# Patient Record
Sex: MALE | Race: WHITE | Employment: FULL TIME
[De-identification: names, ages, dates, MRNs, and addresses within clinical notes are randomized per-mention and may not be internally consistent; named-entity substitution may affect disease eponyms.]

---
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
## 2022-05-17 ENCOUNTER — OFFICE VISIT (OUTPATIENT)
Dept: FAMILY MEDICINE CLINIC | Age: 22
End: 2022-05-17
Payer: COMMERCIAL

## 2022-05-17 VITALS
SYSTOLIC BLOOD PRESSURE: 170 MMHG | WEIGHT: 315 LBS | HEART RATE: 88 BPM | BODY MASS INDEX: 41.75 KG/M2 | DIASTOLIC BLOOD PRESSURE: 110 MMHG | HEIGHT: 73 IN | OXYGEN SATURATION: 98 %

## 2022-05-17 DIAGNOSIS — F90.2 ATTENTION DEFICIT HYPERACTIVITY DISORDER (ADHD), COMBINED TYPE: ICD-10-CM

## 2022-05-17 DIAGNOSIS — E66.01 SEVERE OBESITY (BMI >= 40) (HCC): ICD-10-CM

## 2022-05-17 DIAGNOSIS — F41.9 ANXIETY: ICD-10-CM

## 2022-05-17 DIAGNOSIS — I10 HYPERTENSION, UNSPECIFIED TYPE: ICD-10-CM

## 2022-05-17 DIAGNOSIS — Z82.49 FAMILY HISTORY OF MI (MYOCARDIAL INFARCTION): ICD-10-CM

## 2022-05-17 DIAGNOSIS — Z76.89 ENCOUNTER TO ESTABLISH CARE: Primary | ICD-10-CM

## 2022-05-17 DIAGNOSIS — F32.A DEPRESSION, UNSPECIFIED DEPRESSION TYPE: ICD-10-CM

## 2022-05-17 DIAGNOSIS — R29.818 SUSPECTED SLEEP APNEA: ICD-10-CM

## 2022-05-17 PROCEDURE — 99204 OFFICE O/P NEW MOD 45 MIN: CPT

## 2022-05-17 RX ORDER — BUPROPION HYDROCHLORIDE 150 MG/1
150 TABLET ORAL EVERY MORNING
Qty: 90 TABLET | Refills: 0 | Status: SHIPPED
Start: 2022-05-17 | End: 2022-06-24 | Stop reason: ALTCHOICE

## 2022-05-17 RX ORDER — LISINOPRIL AND HYDROCHLOROTHIAZIDE 12.5; 1 MG/1; MG/1
1 TABLET ORAL DAILY
Qty: 90 TABLET | Refills: 1 | Status: SHIPPED | OUTPATIENT
Start: 2022-05-17 | End: 2022-06-24

## 2022-05-17 SDOH — ECONOMIC STABILITY: FOOD INSECURITY: WITHIN THE PAST 12 MONTHS, YOU WORRIED THAT YOUR FOOD WOULD RUN OUT BEFORE YOU GOT MONEY TO BUY MORE.: NEVER TRUE

## 2022-05-17 SDOH — ECONOMIC STABILITY: FOOD INSECURITY: WITHIN THE PAST 12 MONTHS, THE FOOD YOU BOUGHT JUST DIDN'T LAST AND YOU DIDN'T HAVE MONEY TO GET MORE.: NEVER TRUE

## 2022-05-17 ASSESSMENT — ANXIETY QUESTIONNAIRES
5. BEING SO RESTLESS THAT IT IS HARD TO SIT STILL: 2
IF YOU CHECKED OFF ANY PROBLEMS ON THIS QUESTIONNAIRE, HOW DIFFICULT HAVE THESE PROBLEMS MADE IT FOR YOU TO DO YOUR WORK, TAKE CARE OF THINGS AT HOME, OR GET ALONG WITH OTHER PEOPLE: VERY DIFFICULT
4. TROUBLE RELAXING: 2
6. BECOMING EASILY ANNOYED OR IRRITABLE: 3
1. FEELING NERVOUS, ANXIOUS, OR ON EDGE: 2
3. WORRYING TOO MUCH ABOUT DIFFERENT THINGS: 2
GAD7 TOTAL SCORE: 16
2. NOT BEING ABLE TO STOP OR CONTROL WORRYING: 2
7. FEELING AFRAID AS IF SOMETHING AWFUL MIGHT HAPPEN: 3

## 2022-05-17 ASSESSMENT — PATIENT HEALTH QUESTIONNAIRE - PHQ9
4. FEELING TIRED OR HAVING LITTLE ENERGY: 2
SUM OF ALL RESPONSES TO PHQ QUESTIONS 1-9: 19
1. LITTLE INTEREST OR PLEASURE IN DOING THINGS: 3
SUM OF ALL RESPONSES TO PHQ QUESTIONS 1-9: 18
3. TROUBLE FALLING OR STAYING ASLEEP: 3
8. MOVING OR SPEAKING SO SLOWLY THAT OTHER PEOPLE COULD HAVE NOTICED. OR THE OPPOSITE, BEING SO FIGETY OR RESTLESS THAT YOU HAVE BEEN MOVING AROUND A LOT MORE THAN USUAL: 2
10. IF YOU CHECKED OFF ANY PROBLEMS, HOW DIFFICULT HAVE THESE PROBLEMS MADE IT FOR YOU TO DO YOUR WORK, TAKE CARE OF THINGS AT HOME, OR GET ALONG WITH OTHER PEOPLE: 2
7. TROUBLE CONCENTRATING ON THINGS, SUCH AS READING THE NEWSPAPER OR WATCHING TELEVISION: 3
SUM OF ALL RESPONSES TO PHQ QUESTIONS 1-9: 19
5. POOR APPETITE OR OVEREATING: 1
2. FEELING DOWN, DEPRESSED OR HOPELESS: 1
9. THOUGHTS THAT YOU WOULD BE BETTER OFF DEAD, OR OF HURTING YOURSELF: 1
SUM OF ALL RESPONSES TO PHQ9 QUESTIONS 1 & 2: 4
SUM OF ALL RESPONSES TO PHQ QUESTIONS 1-9: 19
6. FEELING BAD ABOUT YOURSELF - OR THAT YOU ARE A FAILURE OR HAVE LET YOURSELF OR YOUR FAMILY DOWN: 3

## 2022-05-17 ASSESSMENT — SOCIAL DETERMINANTS OF HEALTH (SDOH): HOW HARD IS IT FOR YOU TO PAY FOR THE VERY BASICS LIKE FOOD, HOUSING, MEDICAL CARE, AND HEATING?: NOT HARD AT ALL

## 2022-05-17 NOTE — PROGRESS NOTES
Yolanda Reese (:  2000) is a 19 y.o. male,New patient, here for evaluation of the following chief complaint(s):  ADHD, Hypertension, and New Patient         ASSESSMENT/PLAN:  1. Encounter to establish care  2. Suspected sleep apnea  -     Home Sleep Study; Future  3. Hypertension, unspecified type  -     Hemoglobin A1C; Future  -     Home Sleep Study; Future  -     CBC; Future  -     Comprehensive Metabolic Panel, Fasting; Future  -     TSH with Reflex; Future  -     Lipid Panel; Future  -     ECHO Complete 2D W Doppler W Color; Future  -     EKG 12 Lead; Future  -     lisinopril-hydroCHLOROthiazide (PRINZIDE;ZESTORETIC) 10-12.5 MG per tablet; Take 1 tablet by mouth daily, Disp-90 tablet, R-1Normal  4. Anxiety  -     Vitamin D 25 Hydroxy; Future  -     buPROPion (WELLBUTRIN XL) 150 MG extended release tablet; Take 1 tablet by mouth every morning, Disp-90 tablet, R-0Normal  5. Depression, unspecified depression type  -     Vitamin D 25 Hydroxy; Future  -     buPROPion (WELLBUTRIN XL) 150 MG extended release tablet; Take 1 tablet by mouth every morning, Disp-90 tablet, R-0Normal  6. Family history of MI (myocardial infarction)  -     ECHO Complete 2D W Doppler W Color; Future  7. Attention deficit hyperactivity disorder (ADHD), combined type  -     buPROPion (WELLBUTRIN XL) 150 MG extended release tablet; Take 1 tablet by mouth every morning, Disp-90 tablet, R-0Normal  8. Severe obesity (BMI >= 40) ([facility])    Patient to start medications as prescribed. Labs as ordered. Complete echocardiogram and EKG. Complete sleep study. Encouraged low-fat diet, low in carbohydrates, and added sugars. Advised patient to check blood pressures regularly when starting blood pressure medication, and document for follow-up. Patient aware of red flag symptoms to go to ER, which include cute onset chest pain, shortness of breath, or palpitations.   Advised patient that he could greatly benefit from therapy for anxiety and depression, and father's death. However patient has declined at this time. Return in about 4 weeks (around 6/14/2022). Subjective   SUBJECTIVE/OBJECTIVE:  Linda Guzman presents today with his fiancée for concerns of unmanaged ADHD. He was previously on stimulant therapy to help manage this. However on arrival to the office today, patient's blood pressure is 170/110. He is not currently a candidate for stimulant therapy until his blood pressure is well managed. It is also noted today, that on our screening tools patient has been battling significant anxiety and depression. When speaking with patient about the ongoing anxiety and depression, patient states that this is started at a very young age after his father's death. His father did die of a heart attack in his early 46s, when Linda Guzman was a very young boy. It is also noted today, the patient's BMI is greater than 40, and which places him at greater risk for cardiovascular events as well as obstructive sleep apnea. I did discuss with both patient and spouse, they at I feel it would be appropriate to get him started on antihypertensive medications as well as trying Wellbutrin to help with not only the anxiety and depression, but also the off label for the attention deficit. Also given the significant history of his father's sudden cardiac death, his current hypertensive status, and his weight, and EKG and echocardiogram should be completed. Patient and fiancé are both agreeable. Patient does state that he has significant chronic fatigue and tiredness. He also states he battles with headaches frequently. I do suspect that some of this is related to his unmanaged blood pressure and could also be from a sleep apnea, as his fiancée states he does snore loudly. Patient does deny any chest pain or palpitations. Review of Systems   Constitutional: Positive for fatigue. Negative for activity change, fever and unexpected weight change.    HENT: Negative for congestion, dental problem and sore throat. Snores loudly     Eyes: Negative for discharge and visual disturbance. Respiratory: Negative for cough and shortness of breath. Cardiovascular: Negative for chest pain, palpitations and leg swelling. Gastrointestinal: Negative for constipation, diarrhea, nausea and vomiting. Genitourinary: Negative for difficulty urinating and dysuria. Musculoskeletal: Negative for arthralgias and myalgias. Skin: Negative for rash and wound. Allergic/Immunologic: Negative for environmental allergies. Neurological: Negative for headaches. Hematological: Does not bruise/bleed easily. Psychiatric/Behavioral: Positive for decreased concentration and dysphoric mood. Negative for agitation, sleep disturbance and suicidal ideas. The patient is nervous/anxious. Objective   Physical Exam  Constitutional:       General: He is not in acute distress. Appearance: He is obese. He is not ill-appearing, toxic-appearing or diaphoretic. Cardiovascular:      Rate and Rhythm: Normal rate and regular rhythm. Heart sounds: Normal heart sounds. No murmur heard. Pulmonary:      Effort: Pulmonary effort is normal. No respiratory distress. Breath sounds: Normal breath sounds. Abdominal:      General: Abdomen is protuberant. Musculoskeletal:         General: No swelling. Right lower leg: No edema. Left lower leg: No edema. Skin:     Capillary Refill: Capillary refill takes less than 2 seconds. Neurological:      General: No focal deficit present. Mental Status: He is alert and oriented to person, place, and time. Motor: No weakness. Coordination: Coordination normal.      Gait: Gait normal.   Psychiatric:         Attention and Perception: Attention normal.         Mood and Affect: Mood is anxious and depressed. Mood is not elated. Affect is flat. Affect is not labile.          Speech: Speech normal. Behavior: Behavior normal. Behavior is cooperative. Thought Content: Thought content normal. Thought content does not include suicidal ideation. Thought content does not include suicidal plan. Cognition and Memory: Cognition normal.         Judgment: Judgment normal.      Comments: Throughout conversation today, and is visibly present per patient is anxious about further work-up. He is at given positive affirmation by both myself and his fiancée in regards to further work-up based off of his father's history. Patient denies any suicidal thoughts. He is however concerned about results of further work-up, but does agree that it should be completed. PHQ Scores 5/17/2022 9/7/2016   PHQ2 Score 4 1   PHQ9 Score 19 7     Interpretation of Total Score Depression Severity: 1-4 = Minimal depression, 5-9 = Mild depression, 10-14 = Moderate depression, 15-19 = Moderately severe depression, 20-27 = Severe depression    JULISSA 7 SCORE 5/17/2022   JULISSA-7 Total Score 16     Interpretation of JULISSA-7 score: 5-9 = mild anxiety, 10-14 = moderate anxiety, 15+ = severe anxiety. Recommend referral to behavioral health for scores 10 or greater. On this date 5/17/2022 I have spent 32 minutes reviewing previous notes, test results and face to face with the patient discussing the diagnosis and importance of compliance with the treatment plan as well as documenting on the day of the visit. An electronic signature was used to authenticate this note. --YEMI Chase - BRUNILDA Kline is here for evaluation for ADHD.   male is not in school    DSM-IV Diagnostic Criteria for ADHD    Rating scale (Rare- 0, Occasional - 1, Frequent - 2)    Inattention:   · Fails to give close attention to details or makes careless mistakes in schoolwork, work, or other activities: 2  · Has difficulty sustaining attention is tasks or play activities:  2  · Does not seem to listen when spoken to directly: 1  · Does not follow through on instructions and fails to finish schoolwork, chores, or duties(not due to oppositional behavior or failure to understand instr): 1  · Difficulty organizing tasks and activities:  2  · Avoids, dislikes or is reluctant to engage in tasks that require sustained mental effort: 2  · Loses things necessary for tasks or activities:   1  · Easily distracted by extraneous stimuli:  2  · Forgetful in daily activities:  2    InattentionTotal (questions with score of 1 or 2) (9/9):   Total points:15    Hyperactivity:  · Fidgets with hands or feet and squirms in seat:  2  · Leaves seat in classroom or in other situations in which remaining seated is expected :  1  · Runs about or climbs excessively in situations in which it is inappropriate of feelings of restlessness:  1  · Often has difficulty playing or engaging in leisure activities quietly:  1  · \"On the go\" or acts as if \"drivern by a motor\":  1  · Talks excessively:  2    Impulsivity:  · Blurts out answers before questions have been completed:  1  · Difficulty awaiting turn:  2  · Interrupts or intrudes on others:  1    Hyperactive/ImpulsivityTotal (questions with score of 1 or 2) (9/9):  Total points:12

## 2022-05-23 PROBLEM — Z82.49 FAMILY HISTORY OF MI (MYOCARDIAL INFARCTION): Status: ACTIVE | Noted: 2022-05-23

## 2022-05-23 PROBLEM — F32.A DEPRESSION: Status: ACTIVE | Noted: 2022-05-23

## 2022-05-23 PROBLEM — I10 HYPERTENSION: Status: ACTIVE | Noted: 2022-05-23

## 2022-05-23 PROBLEM — F41.9 ANXIETY: Status: ACTIVE | Noted: 2022-05-23

## 2022-05-23 ASSESSMENT — ENCOUNTER SYMPTOMS
EYE DISCHARGE: 0
COUGH: 0
NAUSEA: 0
CONSTIPATION: 0
VOMITING: 0
SORE THROAT: 0
DIARRHEA: 0
SHORTNESS OF BREATH: 0

## 2022-06-07 ENCOUNTER — HOSPITAL ENCOUNTER (OUTPATIENT)
Age: 22
Setting detail: SPECIMEN
Discharge: HOME | End: 2022-06-07

## 2022-06-07 DIAGNOSIS — F41.9 ANXIETY: ICD-10-CM

## 2022-06-07 DIAGNOSIS — F32.A DEPRESSION, UNSPECIFIED DEPRESSION TYPE: ICD-10-CM

## 2022-06-07 DIAGNOSIS — I10 HYPERTENSION, UNSPECIFIED TYPE: ICD-10-CM

## 2022-06-07 LAB
ALBUMIN SERPL-MCNC: 4.7 G/DL (ref 3.5–5.2)
ALBUMIN/GLOBULIN RATIO: 1.5 (ref 1–2.5)
ALP BLD-CCNC: 52 U/L (ref 40–129)
ALT SERPL-CCNC: 32 U/L (ref 5–41)
ANION GAP SERPL CALCULATED.3IONS-SCNC: 19 MMOL/L (ref 9–17)
AST SERPL-CCNC: 23 U/L
BILIRUB SERPL-MCNC: 0.29 MG/DL (ref 0.3–1.2)
BUN BLDV-MCNC: 17 MG/DL (ref 6–20)
CALCIUM SERPL-MCNC: 9.9 MG/DL (ref 8.6–10.4)
CHLORIDE BLD-SCNC: 100 MMOL/L (ref 98–107)
CHOLESTEROL/HDL RATIO: 4.8
CHOLESTEROL: 207 MG/DL
CO2: 20 MMOL/L (ref 20–31)
CREAT SERPL-MCNC: 0.82 MG/DL (ref 0.7–1.2)
ESTIMATED AVERAGE GLUCOSE: 120 MG/DL
GFR AFRICAN AMERICAN: >60 ML/MIN
GFR NON-AFRICAN AMERICAN: >60 ML/MIN
GFR SERPL CREATININE-BSD FRML MDRD: ABNORMAL ML/MIN/{1.73_M2}
GLUCOSE FASTING: 97 MG/DL (ref 70–99)
HBA1C MFR BLD: 5.8 % (ref 4–6)
HCT VFR BLD CALC: 43.1 % (ref 40.7–50.3)
HDLC SERPL-MCNC: 43 MG/DL
HEMOGLOBIN: 14.6 G/DL (ref 13–17)
LDL CHOLESTEROL: 131 MG/DL (ref 0–130)
MCH RBC QN AUTO: 28.3 PG (ref 25.2–33.5)
MCHC RBC AUTO-ENTMCNC: 33.9 G/DL (ref 28.4–34.8)
MCV RBC AUTO: 83.5 FL (ref 82.6–102.9)
NRBC AUTOMATED: 0 PER 100 WBC
PDW BLD-RTO: 12.9 % (ref 11.8–14.4)
PLATELET # BLD: 304 K/UL (ref 138–453)
PMV BLD AUTO: 9.8 FL (ref 8.1–13.5)
POTASSIUM SERPL-SCNC: 4.2 MMOL/L (ref 3.7–5.3)
RBC # BLD: 5.16 M/UL (ref 4.21–5.77)
SODIUM BLD-SCNC: 139 MMOL/L (ref 135–144)
TOTAL PROTEIN: 7.9 G/DL (ref 6.4–8.3)
TRIGL SERPL-MCNC: 167 MG/DL
TSH SERPL DL<=0.05 MIU/L-ACNC: 2.15 UIU/ML (ref 0.3–5)
VITAMIN D 25-HYDROXY: 27 NG/ML
WBC # BLD: 6.6 K/UL (ref 3.5–11.3)

## 2022-06-21 ENCOUNTER — HOSPITAL ENCOUNTER (OUTPATIENT)
Dept: SLEEP CENTER | Age: 22
Discharge: HOME | End: 2022-06-23
Payer: COMMERCIAL

## 2022-06-21 ENCOUNTER — HOSPITAL ENCOUNTER (OUTPATIENT)
Dept: NON INVASIVE DIAGNOSTICS | Age: 22
Discharge: HOME | End: 2022-06-23
Payer: COMMERCIAL

## 2022-06-21 DIAGNOSIS — I10 HYPERTENSION, UNSPECIFIED TYPE: ICD-10-CM

## 2022-06-21 DIAGNOSIS — R29.818 SUSPECTED SLEEP APNEA: ICD-10-CM

## 2022-06-21 DIAGNOSIS — Z82.49 FAMILY HISTORY OF MI (MYOCARDIAL INFARCTION): ICD-10-CM

## 2022-06-21 LAB
LV EF: 55 %
LVEF MODALITY: NORMAL

## 2022-06-21 PROCEDURE — G0399 HOME SLEEP TEST/TYPE 3 PORTA: HCPCS

## 2022-06-21 PROCEDURE — 93306 TTE W/DOPPLER COMPLETE: CPT

## 2022-06-24 ENCOUNTER — OFFICE VISIT (OUTPATIENT)
Dept: FAMILY MEDICINE CLINIC | Age: 22
End: 2022-06-24
Payer: COMMERCIAL

## 2022-06-24 VITALS
OXYGEN SATURATION: 97 % | HEIGHT: 73 IN | BODY MASS INDEX: 41.75 KG/M2 | WEIGHT: 315 LBS | SYSTOLIC BLOOD PRESSURE: 160 MMHG | HEART RATE: 93 BPM | DIASTOLIC BLOOD PRESSURE: 92 MMHG

## 2022-06-24 DIAGNOSIS — I10 HYPERTENSION, UNSPECIFIED TYPE: Primary | ICD-10-CM

## 2022-06-24 DIAGNOSIS — Z82.49 FAMILY HISTORY OF MI (MYOCARDIAL INFARCTION): ICD-10-CM

## 2022-06-24 DIAGNOSIS — R73.03 PREDIABETES: ICD-10-CM

## 2022-06-24 PROCEDURE — 99214 OFFICE O/P EST MOD 30 MIN: CPT

## 2022-06-24 RX ORDER — NEBULIZER AND COMPRESSOR
1 EACH MISCELLANEOUS DAILY
Qty: 1 KIT | Refills: 0 | Status: SHIPPED | OUTPATIENT
Start: 2022-06-24 | End: 2023-06-24

## 2022-06-24 RX ORDER — LISINOPRIL AND HYDROCHLOROTHIAZIDE 12.5; 1 MG/1; MG/1
1 TABLET ORAL DAILY
Qty: 90 TABLET | Refills: 1 | Status: SHIPPED | OUTPATIENT
Start: 2022-06-24 | End: 2022-08-04 | Stop reason: DRUGHIGH

## 2022-06-24 NOTE — PROGRESS NOTES
Hero Hoskins (:  2000) is a 19 y.o. male,Established patient, here for evaluation of the following chief complaint(s):  Hypertension and Discuss Labs         ASSESSMENT/PLAN:  1. Hypertension, unspecified type  -     lisinopril-hydroCHLOROthiazide (PRINZIDE;ZESTORETIC) 10-12.5 MG per tablet; Take 1 tablet by mouth daily, Disp-90 tablet, R-1Normal  -     Blood Pressure Monitoring (ADULT BLOOD PRESSURE CUFF LG) KIT; DAILY Starting 2022, Until Sat 2023, For 365 days, Disp-1 kit, R-0, Normal  -     CARDIAC STRESS TEST EXERCISE ONLY; Future  2. Family history of MI (myocardial infarction)  -     CARDIAC STRESS TEST EXERCISE ONLY; Future  3. Prediabetes  Comments:  A1C 5.8- encouraged low carb diet. Repeat labs in 6-12 months    Return for nurse visit for BP any time 1-2 weeks, 6 week follow up office HTN and anxiety/depression. Subjective   SUBJECTIVE/OBJECTIVE:  Here for follow-up after starting antihypertensives and medication for anxiety and depression. Also reviewed labs with patient at this visit. Patient was started on Wellbutrin originally, and states that he folic it was working for couple weeks, but then felt like he was back to his normal self. Patient would like to try alternative medication for this at this time. His blood pressure has slightly decreased since starting antihypertensive, and we will increase his medication dose at this time. Patient has his sleep study scheduled for [facility]. He does continue to have intermittent chest pain, and I do feel that patient would benefit from a stress test.  Patient is very nervous about this, and states that his dad actually  during his stress test.  Patient is however agreeable, and his fiancée is with him today and understands importance of completing this test.      Review of Systems   Constitutional:  Negative for activity change, fatigue, fever and unexpected weight change.    HENT:  Negative for congestion, dental problem and sore throat. Snores loudly     Eyes:  Negative for discharge and visual disturbance. Respiratory:  Negative for cough and shortness of breath. Cardiovascular:  Negative for chest pain, palpitations and leg swelling. Gastrointestinal:  Negative for constipation, diarrhea, nausea and vomiting. Genitourinary:  Negative for difficulty urinating and dysuria. Musculoskeletal:  Negative for arthralgias and myalgias. Skin:  Negative for rash and wound. Allergic/Immunologic: Negative for environmental allergies. Neurological:  Negative for headaches. Hematological:  Does not bruise/bleed easily. Psychiatric/Behavioral:  Positive for decreased concentration and dysphoric mood. Negative for agitation, sleep disturbance and suicidal ideas. The patient is nervous/anxious. Objective   Physical Exam  Constitutional:       General: He is not in acute distress. Appearance: He is obese. He is not ill-appearing, toxic-appearing or diaphoretic. Cardiovascular:      Rate and Rhythm: Normal rate and regular rhythm. Heart sounds: Normal heart sounds. No murmur heard. Pulmonary:      Effort: Pulmonary effort is normal. No respiratory distress. Breath sounds: Normal breath sounds. Skin:     General: Skin is warm and dry. Neurological:      Mental Status: He is alert and oriented to person, place, and time. Psychiatric:         Mood and Affect: Mood is anxious. Speech: Speech normal.         Behavior: Behavior normal.          On this date 6/24/2022 I have spent 27 minutes reviewing previous notes, test results and face to face with the patient discussing the diagnosis and importance of compliance with the treatment plan as well as documenting on the day of the visit. An electronic signature was used to authenticate this note.     --Learta Mcburney, APRN - CNP

## 2022-07-13 LAB — STATUS: NORMAL

## 2022-07-18 PROBLEM — R73.03 PREDIABETES: Status: ACTIVE | Noted: 2022-07-18

## 2022-07-18 ASSESSMENT — ENCOUNTER SYMPTOMS
COUGH: 0
CONSTIPATION: 0
EYE DISCHARGE: 0
NAUSEA: 0
DIARRHEA: 0
VOMITING: 0
SORE THROAT: 0
SHORTNESS OF BREATH: 0

## 2022-08-04 ENCOUNTER — OFFICE VISIT (OUTPATIENT)
Dept: FAMILY MEDICINE CLINIC | Age: 22
End: 2022-08-04
Payer: COMMERCIAL

## 2022-08-04 VITALS
DIASTOLIC BLOOD PRESSURE: 62 MMHG | HEART RATE: 94 BPM | BODY MASS INDEX: 41.75 KG/M2 | SYSTOLIC BLOOD PRESSURE: 162 MMHG | WEIGHT: 315 LBS | OXYGEN SATURATION: 98 % | HEIGHT: 73 IN

## 2022-08-04 DIAGNOSIS — I10 HYPERTENSION, UNSPECIFIED TYPE: Primary | ICD-10-CM

## 2022-08-04 DIAGNOSIS — F41.9 ANXIETY: ICD-10-CM

## 2022-08-04 DIAGNOSIS — F90.2 ATTENTION DEFICIT HYPERACTIVITY DISORDER (ADHD), COMBINED TYPE: ICD-10-CM

## 2022-08-04 DIAGNOSIS — F32.A DEPRESSION, UNSPECIFIED DEPRESSION TYPE: ICD-10-CM

## 2022-08-04 PROCEDURE — 99214 OFFICE O/P EST MOD 30 MIN: CPT

## 2022-08-04 RX ORDER — LISINOPRIL AND HYDROCHLOROTHIAZIDE 25; 20 MG/1; MG/1
1 TABLET ORAL DAILY
Qty: 30 TABLET | Refills: 5 | Status: SHIPPED | OUTPATIENT
Start: 2022-08-04 | End: 2022-09-17 | Stop reason: SDUPTHER

## 2022-08-04 RX ORDER — BUPROPION HYDROCHLORIDE 150 MG/1
TABLET ORAL
Qty: 90 TABLET | Refills: 1 | Status: SHIPPED | OUTPATIENT
Start: 2022-08-04 | End: 2022-09-17 | Stop reason: SDUPTHER

## 2022-08-04 NOTE — PROGRESS NOTES
Sri Grayson (:  2000) is a 19 y.o. male,Established patient, here for evaluation of the following chief complaint(s):  Hypertension and Anxiety         ASSESSMENT/PLAN:  1. Hypertension, unspecified type  -     lisinopril-hydroCHLOROthiazide (PRINZIDE;ZESTORETIC) 20-25 MG per tablet; Take 1 tablet by mouth in the morning., Disp-30 tablet, R-5Normal  2. Depression, unspecified depression type  -     buPROPion (WELLBUTRIN XL) 150 MG extended release tablet; Take 1 tablet by mouth daily for first two weeks. Increase to two tablet daily there after., Disp-90 tablet, R-1Normal  3. Anxiety  -     buPROPion (WELLBUTRIN XL) 150 MG extended release tablet; Take 1 tablet by mouth daily for first two weeks. Increase to two tablet daily there after., Disp-90 tablet, R-1Normal  4. Attention deficit hyperactivity disorder (ADHD), combined type  -     buPROPion (WELLBUTRIN XL) 150 MG extended release tablet; Take 1 tablet by mouth daily for first two weeks. Increase to two tablet daily there after., Disp-90 tablet, R-1Normal    Complete stress test as previously ordered. We can not pursue any stimulant therapy at this time due to htn. Patient to check BP at home. Report sooner if any symptoms of low blood pressure. Encouraged weight loss with healthy diet    Return in about 2 months (around 10/4/2022). Subjective   SUBJECTIVE/OBJECTIVE:  Patient here today for follow-up of his high blood pressure. Patient has been taking Prinzide, with some noted improvement, however he remains hypertensive in the office today. We will increase his dose at this time. Patient continues to have intermittent chest pain, especially when he is at work, and doing physically laboring activities. The chest pain typically resolves on its own. Patient did have a stress test ordered, however he has not completed this yet.   Patient also had a home sleep study test completed, which did not show any significant obstructive sleep apnea.    Derick Dumont was previously started on Wellbutrin, for the depression, and ADHD. During that time, he did have noted improvement in his overall mood. He did however feel like the medication started not being as effective, and at his last visit had opted to transition and try something different. We started him on Zoloft at that time, and he states that he has noted no difference whatsoever. Patient is agreeable to going back on the Wellbutrin 150 mg extended release, and increase to 300 mg dose. At this time he is not a candidate for stimulant therapy given his underlying, not yet well managed, hypertension. Review of Systems   Constitutional:  Negative for activity change, fatigue, fever and unexpected weight change. HENT:  Negative for congestion, dental problem and sore throat. Snores loudly     Eyes:  Negative for discharge and visual disturbance. Respiratory:  Negative for cough and shortness of breath. Cardiovascular:  Positive for chest pain (intermittent at work and doing physical activities). Negative for palpitations and leg swelling. Gastrointestinal:  Negative for constipation, diarrhea, nausea and vomiting. Genitourinary:  Negative for difficulty urinating and dysuria. Musculoskeletal:  Negative for arthralgias and myalgias. Skin:  Negative for rash and wound. Allergic/Immunologic: Negative for environmental allergies. Neurological:  Negative for headaches. Hematological:  Does not bruise/bleed easily. Psychiatric/Behavioral:  Positive for decreased concentration and dysphoric mood. Negative for agitation, sleep disturbance and suicidal ideas. The patient is nervous/anxious. Objective   Physical Exam  Constitutional:       General: He is not in acute distress. Appearance: He is obese. He is not ill-appearing, toxic-appearing or diaphoretic. Cardiovascular:      Rate and Rhythm: Normal rate and regular rhythm.       Heart sounds: Normal heart sounds. No murmur heard. Pulmonary:      Effort: Pulmonary effort is normal. No respiratory distress. Breath sounds: Normal breath sounds. Skin:     General: Skin is warm and dry. Neurological:      Mental Status: He is alert and oriented to person, place, and time. Motor: No weakness. Psychiatric:         Mood and Affect: Mood is anxious. Speech: Speech normal.         Behavior: Behavior normal.         Thought Content: Thought content normal.          On this date 8/4/2022 I have spent 20 minutes reviewing previous notes, test results and face to face with the patient discussing the diagnosis and importance of compliance with the treatment plan as well as documenting on the day of the visit. An electronic signature was used to authenticate this note.     --YEMI Thomas - CNP

## 2022-08-17 ASSESSMENT — ENCOUNTER SYMPTOMS
VOMITING: 0
COUGH: 0
CONSTIPATION: 0
SORE THROAT: 0
SHORTNESS OF BREATH: 0
EYE DISCHARGE: 0
DIARRHEA: 0
NAUSEA: 0

## 2022-09-17 DIAGNOSIS — F41.9 ANXIETY: ICD-10-CM

## 2022-09-17 DIAGNOSIS — I10 HYPERTENSION, UNSPECIFIED TYPE: ICD-10-CM

## 2022-09-17 DIAGNOSIS — F32.A DEPRESSION, UNSPECIFIED DEPRESSION TYPE: ICD-10-CM

## 2022-09-17 DIAGNOSIS — F90.2 ATTENTION DEFICIT HYPERACTIVITY DISORDER (ADHD), COMBINED TYPE: ICD-10-CM

## 2022-09-19 NOTE — TELEPHONE ENCOUNTER
LOV: 8/4/22  LRF: 8/4/22  NA: 10/4/22  Health Maintenance   Topic Date Due    Varicella vaccine (1 of 2 - 2-dose childhood series) Never done    HIV screen  Never done    Hepatitis C screen  Never done    DTaP/Tdap/Td vaccine (7 - Td or Tdap) 10/20/2020    Flu vaccine (1) 09/01/2022    COVID-19 Vaccine (1) 05/17/2023 (Originally 6/4/2001)    Depression Monitoring  05/17/2023    A1C test (Diabetic or Prediabetic)  06/07/2023    Hepatitis A vaccine  Completed    Hepatitis B vaccine  Completed    Hib vaccine  Completed    HPV vaccine  Completed    Meningococcal (ACWY) vaccine  Completed    Pneumococcal 0-64 years Vaccine  Aged Out             (applicable per patient's age: Cancer Screenings, Depression Screening, Fall Risk Screening, Immunizations)    Hemoglobin A1C (%)   Date Value   06/07/2022 5.8     LDL Cholesterol (mg/dL)   Date Value   06/07/2022 131 (H)     AST (U/L)   Date Value   06/07/2022 23     ALT (U/L)   Date Value   06/07/2022 32     BUN (mg/dL)   Date Value   06/07/2022 17      (goal A1C is < 7)   (goal LDL is <100) need 30-50% reduction from baseline     BP Readings from Last 3 Encounters:   08/04/22 (!) 162/62   06/24/22 (!) 160/92   05/17/22 (!) 170/110    (goal /80)      All Future Testing planned in [facility]:  Lab Frequency Next Occurrence   EKG 12 Lead Once 05/17/2022   CARDIAC STRESS TEST EXERCISE ONLY Once 06/24/2022       Next Visit Date:  Future Appointments   Date Time Provider Gen [place]   10/4/2022  3:00 PM YEMI Marie - CNP [facility]            Patient Active Problem List:     Severe obesity (BMI >= 40) ([address]     History of chickenpox     Attention deficit hyperactivity disorder (ADHD), combined type     Elevated blood pressure reading     Hypertension     Anxiety     Depression     Family history of MI (myocardial infarction)     Prediabetes

## 2022-09-20 RX ORDER — LISINOPRIL AND HYDROCHLOROTHIAZIDE 25; 20 MG/1; MG/1
1 TABLET ORAL DAILY
Qty: 30 TABLET | Refills: 5 | Status: SHIPPED | OUTPATIENT
Start: 2022-09-20

## 2022-09-20 RX ORDER — BUPROPION HYDROCHLORIDE 150 MG/1
TABLET ORAL
Qty: 90 TABLET | Refills: 1 | Status: SHIPPED | OUTPATIENT
Start: 2022-09-20

## 2022-12-08 ENCOUNTER — OFFICE VISIT (OUTPATIENT)
Dept: FAMILY MEDICINE CLINIC | Age: 22
End: 2022-12-08
Payer: COMMERCIAL

## 2022-12-08 VITALS
HEART RATE: 107 BPM | DIASTOLIC BLOOD PRESSURE: 100 MMHG | OXYGEN SATURATION: 96 % | SYSTOLIC BLOOD PRESSURE: 186 MMHG | HEIGHT: 73 IN | BODY MASS INDEX: 41.75 KG/M2 | WEIGHT: 315 LBS

## 2022-12-08 DIAGNOSIS — F43.12 CHRONIC POST-TRAUMATIC STRESS DISORDER: ICD-10-CM

## 2022-12-08 DIAGNOSIS — I10 HYPERTENSION, UNSPECIFIED TYPE: Primary | ICD-10-CM

## 2022-12-08 DIAGNOSIS — F41.9 ANXIETY: ICD-10-CM

## 2022-12-08 DIAGNOSIS — R45.851 SUICIDAL IDEATIONS: ICD-10-CM

## 2022-12-08 DIAGNOSIS — F34.1 PERSISTENT DEPRESSIVE DISORDER: ICD-10-CM

## 2022-12-08 DIAGNOSIS — R00.0 TACHYCARDIA: ICD-10-CM

## 2022-12-08 DIAGNOSIS — R51.9 CHRONIC DAILY HEADACHE: ICD-10-CM

## 2022-12-08 PROCEDURE — 93010 ELECTROCARDIOGRAM REPORT: CPT

## 2022-12-08 PROCEDURE — 99215 OFFICE O/P EST HI 40 MIN: CPT

## 2022-12-08 PROCEDURE — 3074F SYST BP LT 130 MM HG: CPT

## 2022-12-08 PROCEDURE — 3078F DIAST BP <80 MM HG: CPT

## 2022-12-08 PROCEDURE — 93000 ELECTROCARDIOGRAM COMPLETE: CPT

## 2022-12-08 RX ORDER — LISINOPRIL AND HYDROCHLOROTHIAZIDE 20; 12.5 MG/1; MG/1
2 TABLET ORAL DAILY
Qty: 60 TABLET | Refills: 0 | Status: SHIPPED | OUTPATIENT
Start: 2022-12-08 | End: 2022-12-28 | Stop reason: SDUPTHER

## 2022-12-08 RX ORDER — PROPRANOLOL HYDROCHLORIDE 60 MG/1
TABLET ORAL
Qty: 42 TABLET | Refills: 0 | Status: SHIPPED | OUTPATIENT
Start: 2022-12-08 | End: 2022-12-28 | Stop reason: SDUPTHER

## 2022-12-08 RX ORDER — CLONIDINE HYDROCHLORIDE 0.1 MG/1
0.1 TABLET ORAL 2 TIMES DAILY
Qty: 60 TABLET | Refills: 0 | Status: SHIPPED
Start: 2022-12-08 | End: 2022-12-08

## 2022-12-08 NOTE — Clinical Note
Staff please follow up on [employer]? Did we get this-  Please schedule patient follow up in office to discuss once we have results.

## 2022-12-08 NOTE — PROGRESS NOTES
Stephanie Barriga (:  2000) is a 23 y.o. male, established patient, here for evaluation of the following chief complaint(s):  Depression, Hypertension, and Follow-up         ASSESSMENT/PLAN:  1. Hypertension, unspecified type  -     propranolol (INDERAL) 60 MG tablet; Take 1 tablet by mouth daily for 7 days, THEN 1 tablet 2 times daily for 7 days, THEN 1 tablet 3 times daily for 7 days. , Disp-42 tablet, R-0Normal  -     lisinopril-hydroCHLOROthiazide (PRINZIDE;ZESTORETIC) 20-12.5 MG per tablet; Take 2 tablets by mouth daily, Disp-60 tablet, R-0Normal  -  Clonidine 0.1mg tablet; Take 1 tablet by mouth twice daily, Disp- 60, R-0  -     EKG 12 Lead; Future  -     Basic Metabolic Panel; Future  -     [facility]  2. Persistent depressive disorder  -     [facility] Psychotropic (combinatorial pharmacogenomics test); Future  3. Anxiety  -     propranolol (INDERAL) 60 MG tablet; Take 1 tablet by mouth daily for 7 days, THEN 1 tablet 2 times daily for 7 days, THEN 1 tablet 3 times daily for 7 days. , Disp-42 tablet, R-0Normal  -     [facility] Psychotropic (combinatorial pharmacogenomics test); Future    Patient to start medications as prescribed. Advised to keep close monitoring of blood pressures while introducing medications. Advised patient not to take clonidine if blood pressure is much improved with lisinopril and propanolol alone. Patient may titrate propanolol up for both blood pressure and anxiety. [facility]ight collected and sent. Once blood pressure is managed, we will initiate anxiety and depression medications based on [facility] recommendations. Highly encouraged patient to establish with therapist, however he declines. Advised to schedule stress test.    Encouraged low fat diet and exercise. EKG: normal sinus rhythm, no ST elevation noted. -Prominent R(V1) and left axis for age -nonspecific  -Seen with pulmonary or congenital heart disease -possible anterior fascicular block. -Abnormal EKG. Return in 1 week (on 12/15/2022) for Schedule for next week 14th at 4pm- ok to double book/ Cardiology asa Soto. Subjective   SUBJECTIVE/OBJECTIVE:  Patient presents today for follow-up for his hypertension, depression, and anxiety. Patient has been out of his medication for his blood pressure and has not been taking anything. He states there was an issue with getting a refill and he thought he needed to be seen before he could get a refill. Patient continues to have significant chest pain and headaches. Patient did have a previous stress test ordered, however he has not yet completed this. He is very apprehensive to do so, as his father passed away suddenly during his stress test.  This is left the patient with severe depression and PTSD along with anxiety, as he witnessed his fathers passing. He does not want to be in any hospital for testings or workup. Patient states he thinks of suicide daily, however he would not not follow through with this and states he would seek medical attention if he felt that he would. Patient has been on both Wellbutrin and Zoloft for depression and anxiety. Patient did not like the way the Zoloft made him feel, and noted only mild improvement with Wellbutrin. He feels like overall neither of these medications were very beneficial.  He felt that the Wellbutrin helped short-term, but then was back to baseline. Hypertension  This is a chronic problem. The current episode started more than 1 month ago. The problem has been rapidly worsening since onset. The problem is uncontrolled. Associated symptoms include anxiety, chest pain, headaches and malaise/fatigue. Pertinent negatives include no palpitations, peripheral edema or shortness of breath. There are no associated agents to hypertension. Risk factors for coronary artery disease include male gender, obesity, sedentary lifestyle, stress and smoking/tobacco exposure.  Past treatments include ACE inhibitors and diuretics. The current treatment provides significant improvement. Compliance problems include psychosocial issues, exercise and diet. Hypertensive end-organ damage includes angina. There is no history of chronic renal disease, a hypertension causing med or sleep apnea. Depression  Visit Type: follow-up  Patient presents with the following symptoms: chest pain, excessive worry, fatigue, feelings of hopelessness, feelings of worthlessness, hypersomnia, insomnia, nervousness/anxiety, suicidal ideas, thoughts of death and weight gain. Patient is not experiencing: impotence, palpitations and shortness of breath. Frequency of symptoms: constantly   Severity: interfering with daily activities   Sleep quality: poor    Anxiety  Presents for follow-up visit. Symptoms include chest pain, excessive worry, insomnia, nervous/anxious behavior and suicidal ideas. Patient reports no impotence, nausea, palpitations or shortness of breath. Symptoms occur constantly. The severity of symptoms is severe, interfering with daily activities and causing significant distress. The quality of sleep is poor. Side effects of treatment include headaches. Review of Systems   Constitutional:  Positive for fatigue, malaise/fatigue and weight gain. Negative for activity change and fever. HENT:  Negative for dental problem and sore throat. Eyes:  Negative for discharge and visual disturbance. Respiratory:  Negative for cough, chest tightness and shortness of breath. Cardiovascular:  Positive for chest pain. Negative for palpitations and leg swelling. Gastrointestinal:  Negative for constipation, diarrhea, nausea and vomiting. Genitourinary:  Negative for difficulty urinating, dysuria and impotence. Musculoskeletal:  Negative for arthralgias and myalgias. Skin:  Negative for rash and wound. Allergic/Immunologic: Negative for environmental allergies. Neurological:  Positive for headaches. Hematological:  Does not bruise/bleed easily. Psychiatric/Behavioral:  Positive for agitation (Very easily angered), depression, dysphoric mood and suicidal ideas. Negative for self-injury and sleep disturbance. The patient is nervous/anxious and has insomnia. Objective   Physical Exam  Vitals reviewed. Constitutional:       General: He is not in acute distress. Appearance: He is not toxic-appearing. Cardiovascular:      Rate and Rhythm: Regular rhythm. Tachycardia present. Heart sounds: Normal heart sounds. No murmur heard. Pulmonary:      Effort: Pulmonary effort is normal. No respiratory distress. Breath sounds: Normal breath sounds. No wheezing. Skin:     General: Skin is warm and dry. Neurological:      Mental Status: He is alert and oriented to person, place, and time. Mental status is at baseline. Psychiatric:         Attention and Perception: Attention normal.         Mood and Affect: Mood is depressed. Affect is flat. Speech: Speech normal.         Behavior: Behavior normal. Behavior is cooperative. Thought Content: Thought content normal.          On this date 12/8/2022 I have spent 44 minutes reviewing previous notes, test results and face to face with the patient discussing the diagnosis and importance of compliance with the treatment plan as well as documenting on the day of the visit. An electronic signature was used to authenticate this note.     --Abi Munoz, YEMI - CNP

## 2022-12-14 ENCOUNTER — OFFICE VISIT (OUTPATIENT)
Dept: FAMILY MEDICINE CLINIC | Age: 22
End: 2022-12-14
Payer: COMMERCIAL

## 2022-12-14 VITALS
TEMPERATURE: 98.2 F | HEART RATE: 80 BPM | BODY MASS INDEX: 43.41 KG/M2 | OXYGEN SATURATION: 97 % | SYSTOLIC BLOOD PRESSURE: 118 MMHG | DIASTOLIC BLOOD PRESSURE: 80 MMHG | WEIGHT: 315 LBS

## 2022-12-14 DIAGNOSIS — F41.9 ANXIETY: ICD-10-CM

## 2022-12-14 DIAGNOSIS — F43.12 CHRONIC POST-TRAUMATIC STRESS DISORDER: ICD-10-CM

## 2022-12-14 DIAGNOSIS — R51.9 CHRONIC DAILY HEADACHE: ICD-10-CM

## 2022-12-14 DIAGNOSIS — F34.1 PERSISTENT DEPRESSIVE DISORDER: ICD-10-CM

## 2022-12-14 DIAGNOSIS — I10 HYPERTENSION, UNSPECIFIED TYPE: Primary | ICD-10-CM

## 2022-12-14 DIAGNOSIS — R00.0 TACHYCARDIA: ICD-10-CM

## 2022-12-14 PROCEDURE — 3078F DIAST BP <80 MM HG: CPT

## 2022-12-14 PROCEDURE — 3074F SYST BP LT 130 MM HG: CPT

## 2022-12-14 PROCEDURE — 99214 OFFICE O/P EST MOD 30 MIN: CPT

## 2022-12-28 PROBLEM — R00.0 TACHYCARDIA: Status: ACTIVE | Noted: 2022-12-28

## 2022-12-28 PROBLEM — R45.851 SUICIDAL IDEATIONS: Status: ACTIVE | Noted: 2022-12-28

## 2022-12-28 PROBLEM — F43.12 CHRONIC POST-TRAUMATIC STRESS DISORDER: Status: ACTIVE | Noted: 2022-12-28

## 2022-12-28 RX ORDER — LISINOPRIL AND HYDROCHLOROTHIAZIDE 20; 12.5 MG/1; MG/1
2 TABLET ORAL DAILY
Qty: 60 TABLET | Refills: 1 | Status: SHIPPED | OUTPATIENT
Start: 2022-12-28 | End: 2023-01-27

## 2022-12-28 RX ORDER — PROPRANOLOL HYDROCHLORIDE 60 MG/1
60 TABLET ORAL 2 TIMES DAILY
Qty: 60 TABLET | Refills: 0 | Status: SHIPPED | OUTPATIENT
Start: 2022-12-28 | End: 2023-01-27

## 2022-12-28 ASSESSMENT — ENCOUNTER SYMPTOMS
DIARRHEA: 0
SORE THROAT: 0
COUGH: 0
CHEST TIGHTNESS: 0
COUGH: 0
VOMITING: 0
SHORTNESS OF BREATH: 0
SHORTNESS OF BREATH: 0
VOMITING: 0
SORE THROAT: 0
NAUSEA: 0
CHEST TIGHTNESS: 0
CONSTIPATION: 0
EYE DISCHARGE: 0
NAUSEA: 0
EYE DISCHARGE: 0
CONSTIPATION: 0
DIARRHEA: 0

## 2022-12-29 NOTE — PROGRESS NOTES
Carine Faith (:  2000) is a 23 y.o. male,Established patient, here for evaluation of the following chief complaint(s):  Hypertension         ASSESSMENT/PLAN:  1. Hypertension, unspecified type  -     propranolol (INDERAL) 60 MG tablet; Take 1 tablet by mouth 2 times daily, Disp-60 tablet, R-0Normal  -     lisinopril-hydroCHLOROthiazide (PRINZIDE;ZESTORETIC) 20-12.5 MG per tablet; Take 2 tablets by mouth daily, Disp-60 tablet, R-1Normal  2. Tachycardia  -     propranolol (INDERAL) 60 MG tablet; Take 1 tablet by mouth 2 times daily, Disp-60 tablet, R-0Normal  3. Persistent depressive disorder  4. Anxiety  -     propranolol (INDERAL) 60 MG tablet; Take 1 tablet by mouth 2 times daily, Disp-60 tablet, R-0Normal  5. Chronic post-traumatic stress disorder  6. Chronic daily headache  -     propranolol (INDERAL) 60 MG tablet; Take 1 tablet by mouth 2 times daily, Disp-60 tablet, R-0Normal    Continue Prinzide as ordered. Patient to take propanolol dose, 1 tablet in the morning, 1 tablet in the evening. Patient to establish with cardiology, and still highly encouraged to schedule stress test for ongoing chest pain and family history. Patient returns for GeneSight review. Continues to decline therapy referral for anxiety and depression, however does note some decrease in his overall anxiety from introduction of propanolol. Patient to continue blood pressure checks at home intermittently and keep track and journal to bring to cardiology appointment. Patient aware of signs and symptoms to be evaluated in ER    Return in about 4 weeks (around 2023) for Genesight review . Subjective   SUBJECTIVE/OBJECTIVE:  Patient presents today for follow-up for his hypertension. Patient has started the Prinzide, as well as propanolol. Patient did not ever start the clonidine. Patient states that since starting his blood pressure medication, he has had several readings in the 1 teens to 130s over 70s to 80s. Patient does state he has some intermittent lightheadedness when going from a sitting to standing position, however notices improvement with his headaches as well. Patient also notes that the propanolol has somewhat helped with his anxiety. Depression and suicidal thoughts are still persistent, but no plan or intent and states he would seek emergency care if this occurred. We did previously complete Genesight test and do not yet have results. Hypertension  This is a chronic problem. The current episode started more than 1 month ago. The problem has been rapidly improving since onset. The problem is controlled. Associated symptoms include anxiety, chest pain, headaches (improvement) and malaise/fatigue. Pertinent negatives include no palpitations or shortness of breath. There are no associated agents to hypertension. Risk factors for coronary artery disease include family history, sedentary lifestyle, smoking/tobacco exposure, male gender, obesity and stress. Past treatments include ACE inhibitors, diuretics and beta blockers. The current treatment provides significant improvement. Compliance problems include exercise, psychosocial issues and diet. Hypertensive end-organ damage includes angina. There is no history of sleep apnea or a thyroid problem. Review of Systems   Constitutional:  Positive for fatigue and malaise/fatigue. Negative for activity change and fever. HENT:  Negative for dental problem and sore throat. Eyes:  Negative for discharge and visual disturbance. Respiratory:  Negative for cough, chest tightness and shortness of breath. Cardiovascular:  Positive for chest pain. Negative for palpitations and leg swelling. Gastrointestinal:  Negative for constipation, diarrhea, nausea and vomiting. Genitourinary:  Negative for difficulty urinating and dysuria. Musculoskeletal:  Negative for arthralgias and myalgias. Skin:  Negative for rash and wound.    Allergic/Immunologic: Negative for environmental allergies. Neurological:  Positive for light-headedness (Going from sitting to standing) and headaches (improvement). Negative for tremors, syncope and weakness. Hematological:  Does not bruise/bleed easily. Psychiatric/Behavioral:  Positive for agitation (Very easily angered), dysphoric mood and suicidal ideas (No intent or plan- states he would seak immediate medical care if this occured). Negative for self-injury and sleep disturbance. The patient is nervous/anxious (slight improvement with propanolol). Objective   Physical Exam  Vitals reviewed. Constitutional:       General: He is not in acute distress. Appearance: He is obese. He is not ill-appearing, toxic-appearing or diaphoretic. Cardiovascular:      Rate and Rhythm: Regular rhythm. Tachycardia present. Heart sounds: Normal heart sounds. No murmur heard. Pulmonary:      Effort: Pulmonary effort is normal. No respiratory distress. Breath sounds: Normal breath sounds. No wheezing. Skin:     General: Skin is warm and dry. Neurological:      Mental Status: He is alert and oriented to person, place, and time. Mental status is at baseline. Psychiatric:         Attention and Perception: Attention normal.         Mood and Affect: Mood is depressed. Affect is flat. Speech: Speech normal.         Behavior: Behavior normal. Behavior is cooperative. Thought Content: Thought content normal.                An electronic signature was used to authenticate this note.     --YEMI De La Rosa - CNP

## 2023-01-05 NOTE — PROGRESS NOTES
Reached out to [employer] for results.  Ta Gomez said she will check when she can to see if she did the order for us

## 2023-05-05 ENCOUNTER — TELEMEDICINE (OUTPATIENT)
Dept: FAMILY MEDICINE CLINIC | Age: 23
End: 2023-05-05
Payer: COMMERCIAL

## 2023-05-05 DIAGNOSIS — I10 HYPERTENSION, UNSPECIFIED TYPE: ICD-10-CM

## 2023-05-05 PROCEDURE — 99214 OFFICE O/P EST MOD 30 MIN: CPT

## 2023-05-05 RX ORDER — LISINOPRIL AND HYDROCHLOROTHIAZIDE 20; 12.5 MG/1; MG/1
2 TABLET ORAL DAILY
Qty: 60 TABLET | Refills: 11 | Status: SHIPPED | OUTPATIENT
Start: 2023-05-05 | End: 2024-05-04

## 2023-05-05 RX ORDER — PROPRANOLOL HYDROCHLORIDE 20 MG/1
20 TABLET ORAL 3 TIMES DAILY
Qty: 90 TABLET | Refills: 11 | Status: SHIPPED | OUTPATIENT
Start: 2023-05-05

## 2023-05-05 SDOH — ECONOMIC STABILITY: FOOD INSECURITY: WITHIN THE PAST 12 MONTHS, THE FOOD YOU BOUGHT JUST DIDN'T LAST AND YOU DIDN'T HAVE MONEY TO GET MORE.: NEVER TRUE

## 2023-05-05 SDOH — ECONOMIC STABILITY: FOOD INSECURITY: WITHIN THE PAST 12 MONTHS, YOU WORRIED THAT YOUR FOOD WOULD RUN OUT BEFORE YOU GOT MONEY TO BUY MORE.: NEVER TRUE

## 2023-05-05 SDOH — ECONOMIC STABILITY: HOUSING INSECURITY
IN THE LAST 12 MONTHS, WAS THERE A TIME WHEN YOU DID NOT HAVE A STEADY PLACE TO SLEEP OR SLEPT IN A SHELTER (INCLUDING NOW)?: NO

## 2023-05-05 SDOH — ECONOMIC STABILITY: INCOME INSECURITY: HOW HARD IS IT FOR YOU TO PAY FOR THE VERY BASICS LIKE FOOD, HOUSING, MEDICAL CARE, AND HEATING?: NOT HARD AT ALL

## 2023-05-05 ASSESSMENT — PATIENT HEALTH QUESTIONNAIRE - PHQ9
SUM OF ALL RESPONSES TO PHQ9 QUESTIONS 1 & 2: 5
5. POOR APPETITE OR OVEREATING: 2
9. THOUGHTS THAT YOU WOULD BE BETTER OFF DEAD, OR OF HURTING YOURSELF: 1
8. MOVING OR SPEAKING SO SLOWLY THAT OTHER PEOPLE COULD HAVE NOTICED. OR THE OPPOSITE, BEING SO FIGETY OR RESTLESS THAT YOU HAVE BEEN MOVING AROUND A LOT MORE THAN USUAL: 2
7. TROUBLE CONCENTRATING ON THINGS, SUCH AS READING THE NEWSPAPER OR WATCHING TELEVISION: 2
SUM OF ALL RESPONSES TO PHQ QUESTIONS 1-9: 19
6. FEELING BAD ABOUT YOURSELF - OR THAT YOU ARE A FAILURE OR HAVE LET YOURSELF OR YOUR FAMILY DOWN: 2
4. FEELING TIRED OR HAVING LITTLE ENERGY: 3
SUM OF ALL RESPONSES TO PHQ QUESTIONS 1-9: 18
10. IF YOU CHECKED OFF ANY PROBLEMS, HOW DIFFICULT HAVE THESE PROBLEMS MADE IT FOR YOU TO DO YOUR WORK, TAKE CARE OF THINGS AT HOME, OR GET ALONG WITH OTHER PEOPLE: 2
1. LITTLE INTEREST OR PLEASURE IN DOING THINGS: 2
SUM OF ALL RESPONSES TO PHQ QUESTIONS 1-9: 19
SUM OF ALL RESPONSES TO PHQ QUESTIONS 1-9: 19
2. FEELING DOWN, DEPRESSED OR HOPELESS: 3
3. TROUBLE FALLING OR STAYING ASLEEP: 2

## 2023-05-05 ASSESSMENT — ENCOUNTER SYMPTOMS
COUGH: 0
CONSTIPATION: 0
CHEST TIGHTNESS: 1
NAUSEA: 0
DIARRHEA: 0
VOMITING: 0
SHORTNESS OF BREATH: 1
EYE DISCHARGE: 0
SORE THROAT: 0

## 2023-05-05 NOTE — PROGRESS NOTES
Aravind Slade (:  2000) is a Established patient, presenting virtually for evaluation of the following:    Assessment & Plan   Below is the assessment and plan developed based on review of pertinent history, physical exam, labs, studies, and medications. 1. Hypertension, unspecified type  The following orders have not been finalized:  -     propranolol (INDERAL) 20 MG tablet  -     lisinopril-hydroCHLOROthiazide (PRINZIDE;ZESTORETIC) 20-12.5 MG per tablet    Lengthy discussion about risks involved in not taking medication including stroke/ heart attack/ death. Still encouraged stress test- but patient wont complete   Discussed possible holter monitor, but patient needs to restart medication to evaluate chest pain and pressure. Discussed DST- will order at follow up visit. Return for HTN/ ADHD- in office 23. Subjective   Patient presents today for follow-up for his hypertension, depression, anxiety. Patient is via virtual visit today, and states that he has had significant chest pressure and heaviness the last 3 weeks. Upon conversation, patient states he has not been taking any of his blood pressure medicine over the last month. Patient states he was having some lightheadedness and dizziness when taking the medicine, which caused him to stop. Patient did not follow-up on his last visit, as we did complete a [employer] test for his depression and anxiety. This had not yet been received, and we did reach out to [employer] today. The results were sent to the provider office, and we are going to get these results in the Chart. He does continue to hernandez depression, anxiety, along with irritability, difficulty focusing, and suicidal thoughts. He states today he is \"good\", however he thinks about this often. He does have a fiance who is very supportive. Rolena Runner states that when he checked blood pressure 1 day last week, his blood pressure was in the 200s over 100s.     Review of Systems

## 2023-05-08 ENCOUNTER — OFFICE VISIT (OUTPATIENT)
Dept: FAMILY MEDICINE CLINIC | Age: 23
End: 2023-05-08
Payer: COMMERCIAL

## 2023-05-08 VITALS
HEIGHT: 73 IN | WEIGHT: 315 LBS | BODY MASS INDEX: 41.75 KG/M2 | OXYGEN SATURATION: 96 % | DIASTOLIC BLOOD PRESSURE: 92 MMHG | SYSTOLIC BLOOD PRESSURE: 142 MMHG | HEART RATE: 89 BPM

## 2023-05-08 DIAGNOSIS — I10 HYPERTENSION, UNSPECIFIED TYPE: Primary | ICD-10-CM

## 2023-05-08 DIAGNOSIS — F41.9 ANXIETY: ICD-10-CM

## 2023-05-08 DIAGNOSIS — F90.2 ADHD (ATTENTION DEFICIT HYPERACTIVITY DISORDER), COMBINED TYPE: ICD-10-CM

## 2023-05-08 DIAGNOSIS — F34.1 PERSISTENT DEPRESSIVE DISORDER: ICD-10-CM

## 2023-05-08 PROCEDURE — 3078F DIAST BP <80 MM HG: CPT

## 2023-05-08 PROCEDURE — 3074F SYST BP LT 130 MM HG: CPT

## 2023-05-08 PROCEDURE — 99214 OFFICE O/P EST MOD 30 MIN: CPT

## 2023-05-08 RX ORDER — VILOXAZINE HYDROCHLORIDE 200 MG/1
CAPSULE, EXTENDED RELEASE ORAL
Qty: 42 CAPSULE | Refills: 0 | COMMUNITY
Start: 2023-05-08 | End: 2023-06-01 | Stop reason: SINTOL

## 2023-05-31 ASSESSMENT — ENCOUNTER SYMPTOMS
COUGH: 0
VOMITING: 0
CONSTIPATION: 0
NAUSEA: 0
EYE DISCHARGE: 0
DIARRHEA: 0
SHORTNESS OF BREATH: 0
SORE THROAT: 0

## 2023-06-01 ENCOUNTER — OFFICE VISIT (OUTPATIENT)
Dept: FAMILY MEDICINE CLINIC | Age: 23
End: 2023-06-01

## 2023-06-01 VITALS
OXYGEN SATURATION: 96 % | BODY MASS INDEX: 41.75 KG/M2 | HEIGHT: 73 IN | WEIGHT: 315 LBS | HEART RATE: 83 BPM | SYSTOLIC BLOOD PRESSURE: 138 MMHG | DIASTOLIC BLOOD PRESSURE: 88 MMHG

## 2023-06-01 DIAGNOSIS — R73.03 PREDIABETES: ICD-10-CM

## 2023-06-01 DIAGNOSIS — R07.9 INTERMITTENT CHEST PAIN: ICD-10-CM

## 2023-06-01 DIAGNOSIS — T88.7XXA MEDICATION SIDE EFFECT: ICD-10-CM

## 2023-06-01 DIAGNOSIS — Z82.49 FAMILY HISTORY OF MI (MYOCARDIAL INFARCTION): Primary | ICD-10-CM

## 2023-06-01 DIAGNOSIS — I10 HYPERTENSION, UNSPECIFIED TYPE: ICD-10-CM

## 2023-06-25 ASSESSMENT — ENCOUNTER SYMPTOMS
DIARRHEA: 0
VOMITING: 0
NAUSEA: 0
CONSTIPATION: 0
SHORTNESS OF BREATH: 0
SORE THROAT: 0
CHEST TIGHTNESS: 1
EYE DISCHARGE: 0
COUGH: 0

## 2023-07-24 ENCOUNTER — OFFICE VISIT (OUTPATIENT)
Dept: FAMILY MEDICINE CLINIC | Age: 23
End: 2023-07-24
Payer: COMMERCIAL

## 2023-07-24 VITALS
DIASTOLIC BLOOD PRESSURE: 88 MMHG | WEIGHT: 315 LBS | SYSTOLIC BLOOD PRESSURE: 126 MMHG | BODY MASS INDEX: 41.75 KG/M2 | OXYGEN SATURATION: 98 % | HEIGHT: 73 IN

## 2023-07-24 DIAGNOSIS — R73.03 PREDIABETES: Primary | ICD-10-CM

## 2023-07-24 DIAGNOSIS — E66.01 CLASS 3 SEVERE OBESITY WITH SERIOUS COMORBIDITY AND BODY MASS INDEX (BMI) OF 45.0 TO 49.9 IN ADULT, UNSPECIFIED OBESITY TYPE (HCC): ICD-10-CM

## 2023-07-24 DIAGNOSIS — F90.2 ADHD (ATTENTION DEFICIT HYPERACTIVITY DISORDER), COMBINED TYPE: ICD-10-CM

## 2023-07-24 LAB
ALCOHOL URINE: NORMAL
AMPHETAMINE SCREEN, URINE: NEGATIVE
BARBITURATE SCREEN, URINE: NEGATIVE
BENZODIAZEPINE SCREEN, URINE: NEGATIVE
BUPRENORPHINE URINE: NORMAL
COCAINE METABOLITE SCREEN URINE: NEGATIVE
FENTANYL SCREEN, URINE: NORMAL
GABAPENTIN SCREEN, URINE: NORMAL
HBA1C MFR BLD: 5.5 %
MDMA URINE: NEGATIVE
METHADONE SCREEN, URINE: NEGATIVE
METHAMPHETAMINE, URINE: NEGATIVE
OPIATE SCREEN URINE: NORMAL
OXYCODONE SCREEN URINE: NEGATIVE
PHENCYCLIDINE SCREEN URINE: NEGATIVE
PROPOXYPHENE SCREEN, URINE: NORMAL
SYNTHETIC CANNABINOIDS(K2) SCREEN, URINE: NORMAL
THC SCREEN, URINE: NEGATIVE
TRAMADOL SCREEN URINE: NORMAL
TRICYCLIC ANTIDEPRESSANTS, UR: NEGATIVE

## 2023-07-24 PROCEDURE — 3074F SYST BP LT 130 MM HG: CPT

## 2023-07-24 PROCEDURE — 3078F DIAST BP <80 MM HG: CPT

## 2023-07-24 PROCEDURE — 83037 HB GLYCOSYLATED A1C HOME DEV: CPT

## 2023-07-24 PROCEDURE — 99214 OFFICE O/P EST MOD 30 MIN: CPT

## 2023-07-24 PROCEDURE — 80305 DRUG TEST PRSMV DIR OPT OBS: CPT

## 2023-07-24 RX ORDER — SEMAGLUTIDE 1.34 MG/ML
0.25 INJECTION, SOLUTION SUBCUTANEOUS
Qty: 1.5 ML | Refills: 1 | Status: SHIPPED | OUTPATIENT
Start: 2023-07-24 | End: 2023-09-18

## 2023-07-24 RX ORDER — DEXMETHYLPHENIDATE HYDROCHLORIDE 10 MG/1
10 CAPSULE, EXTENDED RELEASE ORAL
Qty: 30 CAPSULE | Refills: 0 | Status: SHIPPED | OUTPATIENT
Start: 2023-07-24 | End: 2023-08-23

## 2023-07-24 NOTE — PROGRESS NOTES
Sheryle Laine (:  2000) is a 23 y.o. male,Established patient, here for evaluation of the following chief complaint(s):  Hypertension         ASSESSMENT/PLAN:  1. Prediabetes  -     POCT glycosylated hemoglobin (Hb A1C)  2. ADHD (attention deficit hyperactivity disorder), combined type  -     Dexmethylphenidate HCl ER 10 MG CP24; Take 1 capsule by mouth daily (with breakfast) for 30 days. Max Daily Amount: 10 mg, Disp-30 capsule, R-0Normal  -     POCT Rapid Drug Screen  3. Class 3 severe obesity with serious comorbidity and body mass index (BMI) of 45.0 to 49.9 in adult, unspecified obesity type ([address])  -     Semaglutide,0.25 or 0.5MG/DOS, (OZEMPIC, 0.25 OR 0.5 MG/DOSE,) 2 MG/1.5ML SOPN; Inject 0.25 mg into the skin every 7 days, Disp-1.5 mL, R-1Normal    A1C slighlty improved to 5.5  Encouraged healthy diet, low in carbs, fats, and exercise 3-4 days weekly. Will start Adderall at 10mg XR and follow up in 4 weeks   Med contract and UDS compelted today   Still highly encouraged patient to complete stress/CT heart   Advised if any increase chest pain or discomfort with stimulant that he needs to stop  Will order Ozempic due to inability to get [facility] for weight loss management if insurance will cover. If not, we will order [facility] when available or consider Saxenda. Encouraged therapy services for ongoing depressive symptoms. Return in about 4 weeks (around 2023) for ADHD follow up. Subjective   SUBJECTIVE/OBJECTIVE:  Patient presents today for follow up on his HTN, ADHD symtoms, and weight. His BP has been doing great with medication compliance. He has not yet completed the stress test or CT that has been previously ordered for intermittent chest pain and family history of MI in his father. He does still have significant ADHD symptoms. Was diagnosed as a child and previously stopped Adderall. We tried Qelbree, but he had significant side effects. Stopped this after one day.

## 2023-08-25 ENCOUNTER — TELEMEDICINE (OUTPATIENT)
Dept: FAMILY MEDICINE CLINIC | Age: 23
End: 2023-08-25
Payer: COMMERCIAL

## 2023-08-25 DIAGNOSIS — F90.2 ADHD (ATTENTION DEFICIT HYPERACTIVITY DISORDER), COMBINED TYPE: ICD-10-CM

## 2023-08-25 DIAGNOSIS — I10 HYPERTENSION, UNSPECIFIED TYPE: Primary | ICD-10-CM

## 2023-08-25 DIAGNOSIS — T88.7XXA MEDICATION SIDE EFFECT: Chronic | ICD-10-CM

## 2023-08-25 DIAGNOSIS — E66.01 CLASS 3 SEVERE OBESITY WITH SERIOUS COMORBIDITY AND BODY MASS INDEX (BMI) OF 45.0 TO 49.9 IN ADULT, UNSPECIFIED OBESITY TYPE (HCC): ICD-10-CM

## 2023-08-25 PROCEDURE — 99214 OFFICE O/P EST MOD 30 MIN: CPT

## 2023-08-25 RX ORDER — CLONIDINE HYDROCHLORIDE 0.1 MG/1
0.1 TABLET ORAL 2 TIMES DAILY
Qty: 60 TABLET | Refills: 3 | Status: SHIPPED | OUTPATIENT
Start: 2023-08-25 | End: 2024-08-24

## 2023-08-25 NOTE — PROGRESS NOTES
Tracy Bauman (:  2000) is a Established patient, presenting virtually for evaluation of the following:    Assessment & Plan   Below is the assessment and plan developed based on review of pertinent history, physical exam, labs, studies, and medications. 1. Hypertension, unspecified type  -     cloNIDine (CATAPRES) 0.1 MG tablet; Take 1 tablet by mouth 2 times daily, Disp-60 tablet, R-3Normal  2. Class 3 severe obesity with serious comorbidity and body mass index (BMI) of 45.0 to 49.9 in adult, unspecified obesity type ([address])  3. ADHD (attention deficit hyperactivity disorder), combined type  4. 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 Advised to check BP next few days. Return in about 1 week (around 2023). Subjective   Patient presents for follow up for his Ozempic and Focalin  However, pharmacy was unable to fill either of these, so he has not yet started. He is however wanting to discuss results he had at [facility] for his fiances infertility. Myrtice Schaumann went and completed a sperm analysis, and he had a low sperm count. Due to this, he was told one of his BP medications may be contributing. We will stop Propanolol and start Clonidine BID. Review of Systems   Genitourinary:         Had low sperm count per [facility] infertility    Psychiatric/Behavioral:  Positive for decreased concentration and dysphoric mood. Negative for sleep disturbance. The patient is nervous/anxious.          Awaiting Focalin RX        Objective   Patient-Reported Vitals  No data recorded     Physical Exam  [INSTRUCTIONS:  \"[x]\" Indicates a positive item  \"[]\" Indicates a negative item  -- DELETE ALL ITEMS NOT EXAMINED]    Constitutional: [x] Appears well-developed and well-nourished [x] No apparent distress      [] Abnormal -     Mental status: [x] Alert and awake  [x] Oriented

## 2023-08-28 ENCOUNTER — PATIENT MESSAGE (OUTPATIENT)
Dept: FAMILY MEDICINE CLINIC | Age: 23
End: 2023-08-28

## 2023-08-28 DIAGNOSIS — E66.01 SEVERE OBESITY (BMI >= 40) (HCC): Primary | ICD-10-CM

## 2023-08-28 RX ORDER — SEMAGLUTIDE 0.25 MG/.5ML
0.25 INJECTION, SOLUTION SUBCUTANEOUS
Qty: 2 ML | Refills: 0 | Status: SHIPPED | OUTPATIENT
Start: 2023-08-28 | End: 2023-09-25

## 2024-04-08 ENCOUNTER — APPOINTMENT (OUTPATIENT)
Dept: GENERAL RADIOLOGY | Facility: CLINIC | Age: 24
End: 2024-04-08
Attending: EMERGENCY MEDICINE
Payer: COMMERCIAL

## 2024-04-08 ENCOUNTER — HOSPITAL ENCOUNTER (EMERGENCY)
Facility: CLINIC | Age: 24
Discharge: HOME | End: 2024-04-09
Attending: EMERGENCY MEDICINE
Payer: COMMERCIAL

## 2024-04-08 DIAGNOSIS — R07.9 CHEST PAIN, UNSPECIFIED TYPE: ICD-10-CM

## 2024-04-08 DIAGNOSIS — I10 ESSENTIAL HYPERTENSION: Primary | ICD-10-CM

## 2024-04-08 DIAGNOSIS — F41.1 ANXIETY STATE: ICD-10-CM

## 2024-04-08 LAB
ANION GAP SERPL CALCULATED.3IONS-SCNC: 10 MMOL/L (ref 9–17)
BASOPHILS # BLD: 0 K/UL (ref 0–0.2)
BASOPHILS NFR BLD: 0 % (ref 0–2)
BUN SERPL-MCNC: 15 MG/DL (ref 6–20)
CALCIUM SERPL-MCNC: 10 MG/DL (ref 8.6–10.4)
CHLORIDE SERPL-SCNC: 102 MMOL/L (ref 98–107)
CO2 SERPL-SCNC: 29 MMOL/L (ref 20–31)
CREAT SERPL-MCNC: 0.9 MG/DL (ref 0.7–1.2)
D DIMER PPP FEU-MCNC: 0.33 UG/ML FEU
EOSINOPHIL # BLD: 0.1 K/UL (ref 0–0.4)
EOSINOPHILS RELATIVE PERCENT: 1 % (ref 1–4)
ERYTHROCYTE [DISTWIDTH] IN BLOOD BY AUTOMATED COUNT: 14.1 % (ref 12.5–15.4)
GFR SERPL CREATININE-BSD FRML MDRD: >90 ML/MIN/1.73M2
GLUCOSE SERPL-MCNC: 89 MG/DL (ref 70–99)
HCT VFR BLD AUTO: 47.4 % (ref 41–53)
HGB BLD-MCNC: 16.5 G/DL (ref 13.5–17.5)
LYMPHOCYTES NFR BLD: 4.4 K/UL (ref 1–4.8)
LYMPHOCYTES RELATIVE PERCENT: 39 % (ref 24–44)
MCH RBC QN AUTO: 28 PG (ref 26–34)
MCHC RBC AUTO-ENTMCNC: 34.7 G/DL (ref 31–37)
MCV RBC AUTO: 80.8 FL (ref 80–100)
MONOCYTES NFR BLD: 1.2 K/UL (ref 0.1–1.2)
MONOCYTES NFR BLD: 11 % (ref 2–11)
NEUTROPHILS NFR BLD: 49 % (ref 36–66)
NEUTS SEG NFR BLD: 5.5 K/UL (ref 1.8–7.7)
PLATELET # BLD AUTO: 326 K/UL (ref 140–450)
PMV BLD AUTO: 7.2 FL (ref 6–12)
POTASSIUM SERPL-SCNC: 3.9 MMOL/L (ref 3.7–5.3)
RBC # BLD AUTO: 5.87 M/UL (ref 4.5–5.9)
SODIUM SERPL-SCNC: 141 MMOL/L (ref 135–144)
TROPONIN I SERPL HS-MCNC: <6 NG/L (ref 0–22)
WBC OTHER # BLD: 11.3 K/UL (ref 3.5–11)

## 2024-04-08 PROCEDURE — 6370000000 HC RX 637 (ALT 250 FOR IP): Performed by: EMERGENCY MEDICINE

## 2024-04-08 PROCEDURE — 85025 COMPLETE CBC W/AUTO DIFF WBC: CPT

## 2024-04-08 PROCEDURE — 80048 BASIC METABOLIC PNL TOTAL CA: CPT

## 2024-04-08 PROCEDURE — 85379 FIBRIN DEGRADATION QUANT: CPT

## 2024-04-08 PROCEDURE — 36415 COLL VENOUS BLD VENIPUNCTURE: CPT

## 2024-04-08 PROCEDURE — 99285 EMERGENCY DEPT VISIT HI MDM: CPT

## 2024-04-08 PROCEDURE — 6360000002 HC RX W HCPCS: Performed by: EMERGENCY MEDICINE

## 2024-04-08 PROCEDURE — 93005 ELECTROCARDIOGRAM TRACING: CPT | Performed by: EMERGENCY MEDICINE

## 2024-04-08 PROCEDURE — 71045 X-RAY EXAM CHEST 1 VIEW: CPT

## 2024-04-08 PROCEDURE — 84484 ASSAY OF TROPONIN QUANT: CPT

## 2024-04-08 PROCEDURE — 96374 THER/PROPH/DIAG INJ IV PUSH: CPT

## 2024-04-08 RX ORDER — LISINOPRIL 10 MG/1
20 TABLET ORAL ONCE
Status: COMPLETED | OUTPATIENT
Start: 2024-04-08 | End: 2024-04-08

## 2024-04-08 RX ORDER — HYDROCHLOROTHIAZIDE 25 MG/1
25 TABLET ORAL ONCE
Status: COMPLETED | OUTPATIENT
Start: 2024-04-08 | End: 2024-04-08

## 2024-04-08 RX ORDER — LORAZEPAM 1 MG/1
1 TABLET ORAL ONCE
Status: COMPLETED | OUTPATIENT
Start: 2024-04-08 | End: 2024-04-08

## 2024-04-08 RX ORDER — LABETALOL HYDROCHLORIDE 5 MG/ML
10 INJECTION, SOLUTION INTRAVENOUS ONCE
Status: COMPLETED | OUTPATIENT
Start: 2024-04-08 | End: 2024-04-08

## 2024-04-08 RX ADMIN — LABETALOL HYDROCHLORIDE 10 MG: 5 INJECTION INTRAVENOUS at 22:55

## 2024-04-08 RX ADMIN — HYDROCHLOROTHIAZIDE 25 MG: 25 TABLET ORAL at 23:19

## 2024-04-08 RX ADMIN — LISINOPRIL 20 MG: 10 TABLET ORAL at 23:19

## 2024-04-08 RX ADMIN — LORAZEPAM 1 MG: 1 TABLET ORAL at 22:55

## 2024-04-08 ASSESSMENT — PAIN - FUNCTIONAL ASSESSMENT
PAIN_FUNCTIONAL_ASSESSMENT: NONE - DENIES PAIN
PAIN_FUNCTIONAL_ASSESSMENT: 0-10

## 2024-04-08 ASSESSMENT — PAIN SCALES - GENERAL: PAINLEVEL_OUTOF10: 5

## 2024-04-08 ASSESSMENT — PAIN DESCRIPTION - ORIENTATION: ORIENTATION: LEFT;UPPER

## 2024-04-08 ASSESSMENT — PAIN DESCRIPTION - LOCATION: LOCATION: CHEST

## 2024-04-09 VITALS
HEIGHT: 73 IN | BODY MASS INDEX: 41.75 KG/M2 | RESPIRATION RATE: 18 BRPM | DIASTOLIC BLOOD PRESSURE: 96 MMHG | OXYGEN SATURATION: 97 % | HEART RATE: 88 BPM | SYSTOLIC BLOOD PRESSURE: 126 MMHG | TEMPERATURE: 98.5 F | WEIGHT: 315 LBS

## 2024-04-09 LAB
EKG ATRIAL RATE: 163 BPM
EKG P AXIS: 47 DEGREES
EKG P-R INTERVAL: 96 MS
EKG Q-T INTERVAL: 302 MS
EKG QRS DURATION: 78 MS
EKG QTC CALCULATION (BAZETT): 497 MS
EKG R AXIS: 4 DEGREES
EKG T AXIS: 45 DEGREES
EKG VENTRICULAR RATE: 163 BPM

## 2024-04-09 ASSESSMENT — PATIENT HEALTH QUESTIONNAIRE - PHQ9
4. FEELING TIRED OR HAVING LITTLE ENERGY: SEVERAL DAYS
3. TROUBLE FALLING OR STAYING ASLEEP: SEVERAL DAYS
SUM OF ALL RESPONSES TO PHQ QUESTIONS 1-9: 9
4. FEELING TIRED OR HAVING LITTLE ENERGY: SEVERAL DAYS
SUM OF ALL RESPONSES TO PHQ QUESTIONS 1-9: 9
SUM OF ALL RESPONSES TO PHQ QUESTIONS 1-9: 9
8. MOVING OR SPEAKING SO SLOWLY THAT OTHER PEOPLE COULD HAVE NOTICED. OR THE OPPOSITE, BEING SO FIGETY OR RESTLESS THAT YOU HAVE BEEN MOVING AROUND A LOT MORE THAN USUAL: SEVERAL DAYS
1. LITTLE INTEREST OR PLEASURE IN DOING THINGS: SEVERAL DAYS
10. IF YOU CHECKED OFF ANY PROBLEMS, HOW DIFFICULT HAVE THESE PROBLEMS MADE IT FOR YOU TO DO YOUR WORK, TAKE CARE OF THINGS AT HOME, OR GET ALONG WITH OTHER PEOPLE: SOMEWHAT DIFFICULT
10. IF YOU CHECKED OFF ANY PROBLEMS, HOW DIFFICULT HAVE THESE PROBLEMS MADE IT FOR YOU TO DO YOUR WORK, TAKE CARE OF THINGS AT HOME, OR GET ALONG WITH OTHER PEOPLE: SOMEWHAT DIFFICULT
9. THOUGHTS THAT YOU WOULD BE BETTER OFF DEAD, OR OF HURTING YOURSELF: SEVERAL DAYS
5. POOR APPETITE OR OVEREATING: SEVERAL DAYS
7. TROUBLE CONCENTRATING ON THINGS, SUCH AS READING THE NEWSPAPER OR WATCHING TELEVISION: SEVERAL DAYS
6. FEELING BAD ABOUT YOURSELF - OR THAT YOU ARE A FAILURE OR HAVE LET YOURSELF OR YOUR FAMILY DOWN: SEVERAL DAYS
2. FEELING DOWN, DEPRESSED OR HOPELESS: SEVERAL DAYS
6. FEELING BAD ABOUT YOURSELF - OR THAT YOU ARE A FAILURE OR HAVE LET YOURSELF OR YOUR FAMILY DOWN: SEVERAL DAYS
3. TROUBLE FALLING OR STAYING ASLEEP: SEVERAL DAYS
7. TROUBLE CONCENTRATING ON THINGS, SUCH AS READING THE NEWSPAPER OR WATCHING TELEVISION: SEVERAL DAYS
SUM OF ALL RESPONSES TO PHQ QUESTIONS 1-9: 9
SUM OF ALL RESPONSES TO PHQ9 QUESTIONS 1 & 2: 2
5. POOR APPETITE OR OVEREATING: SEVERAL DAYS
1. LITTLE INTEREST OR PLEASURE IN DOING THINGS: SEVERAL DAYS
9. THOUGHTS THAT YOU WOULD BE BETTER OFF DEAD, OR OF HURTING YOURSELF: SEVERAL DAYS
8. MOVING OR SPEAKING SO SLOWLY THAT OTHER PEOPLE COULD HAVE NOTICED. OR THE OPPOSITE - BEING SO FIDGETY OR RESTLESS THAT YOU HAVE BEEN MOVING AROUND A LOT MORE THAN USUAL: SEVERAL DAYS
2. FEELING DOWN, DEPRESSED OR HOPELESS: SEVERAL DAYS
SUM OF ALL RESPONSES TO PHQ QUESTIONS 1-9: 8

## 2024-04-09 ASSESSMENT — COLUMBIA-SUICIDE SEVERITY RATING SCALE - C-SSRS
1. IN THE PAST MONTH, HAVE YOU WISHED YOU WERE DEAD OR WISHED YOU COULD GO TO SLEEP AND NOT WAKE UP?: YES
7. DID THIS OCCUR IN THE LAST THREE MONTHS: NO
6. IN YOUR LIFETIME, HAVE YOU EVER DONE ANYTHING, STARTED TO DO ANYTHING, OR PREPARED TO DO ANYTHING TO END YOUR LIFE?: YES
2. IN THE PAST MONTH, HAVE YOU ACTUALLY HAD ANY THOUGHTS OF KILLING YOURSELF?: NO

## 2024-04-09 NOTE — DISCHARGE INSTRUCTIONS
Check your blood pressure for the next 2 weeks, every day and write down. Check during different times during the day.      You may need to take blood pressure medications for the remainder of your life.  Limit the amount of salt that you use. Increase amount of exercise (3 - 4 times a week for minimal of 20 minutes each times).  Eat fresh foods.    PLEASE RETURN TO THE EMERGENCY DEPARTMENT IMMEDIATELY for worsening symptoms, or if you develop any concerning symptoms such as: high fever not relieved by acetaminophen (Tylenol) and/or ibuprofen (Motrin / Advil), chills, shortness of breath, chest pain, feeling of your heart fluttering or racing, persistent nausea and/or vomiting, vomiting up blood, blood in your stool, numbness, loss of consciousness, weakness or tingling in the arms or legs or change in color of the extremities, changes in mental status, persistent headache, blurry vision, loss of bladder / bowel control, unable to follow up with your physician, or other any other care or concern.

## 2024-04-09 NOTE — ED PROVIDER NOTES
Value Ref Range    WBC 11.3 (H) 3.5 - 11.0 k/uL    RBC 5.87 4.5 - 5.9 m/uL    Hemoglobin 16.5 13.5 - 17.5 g/dL    Hematocrit 47.4 41 - 53 %    MCV 80.8 80 - 100 fL    MCH 28.0 26 - 34 pg    MCHC 34.7 31 - 37 g/dL    RDW 14.1 12.5 - 15.4 %    Platelets 326 140 - 450 k/uL    MPV 7.2 6.0 - 12.0 fL    Neutrophils % 49 36 - 66 %    Lymphocytes % 39 24 - 44 %    Monocytes % 11 2 - 11 %    Eosinophils % 1 1 - 4 %    Basophils % 0 0 - 2 %    Neutrophils Absolute 5.50 1.8 - 7.7 k/uL    Lymphocytes Absolute 4.40 1.0 - 4.8 k/uL    Monocytes Absolute 1.20 0.1 - 1.2 k/uL    Eosinophils Absolute 0.10 0.0 - 0.4 k/uL    Basophils Absolute 0.00 0.0 - 0.2 k/uL   BMP   Result Value Ref Range    Sodium 141 135 - 144 mmol/L    Potassium 3.9 3.7 - 5.3 mmol/L    Chloride 102 98 - 107 mmol/L    CO2 29 20 - 31 mmol/L    Anion Gap 10 9 - 17 mmol/L    Glucose 89 70 - 99 mg/dL    BUN 15 6 - 20 mg/dL    Creatinine 0.9 0.7 - 1.2 mg/dL    Est, Glom Filt Rate >90 >60 mL/min/1.73m2    Calcium 10.0 8.6 - 10.4 mg/dL   Troponin   Result Value Ref Range    Troponin, High Sensitivity <6 0 - 22 ng/L   D-Dimer, Quantitative   Result Value Ref Range    D-Dimer, Quant 0.33 ug/mL FEU       EMERGENCY DEPARTMENT COURSE:        The patient was given the following medications:  Orders Placed This Encounter   Medications    labetalol (NORMODYNE;TRANDATE) injection 10 mg    LORazepam (ATIVAN) tablet 1 mg    lisinopril (PRINIVIL;ZESTRIL) tablet 20 mg    hydroCHLOROthiazide (HYDRODIURIL) tablet 25 mg        Vitals:    Vitals:    04/08/24 2305 04/08/24 2310 04/08/24 2317 04/08/24 2343   BP:  (!) 143/86 134/70    Pulse: (!) 105 (!) 101 96 99   Resp: 17 15 16 18   Temp:       SpO2: 94% 94% 96% 95%   Weight:       Height:         -------------------------  BP: 134/70, Temp: 98.5 °F (36.9 °C), Pulse: 99, Respirations: 18    CONSULTS:  None    CRITICAL CARE:   None    PROCEDURES:  None    DIAGNOSIS/ MDM:   Anjel Evans is a 23 y.o. male who presents with intermittent

## 2024-04-10 ENCOUNTER — OFFICE VISIT (OUTPATIENT)
Dept: FAMILY MEDICINE CLINIC | Age: 24
End: 2024-04-10
Payer: COMMERCIAL

## 2024-04-10 VITALS
OXYGEN SATURATION: 98 % | TEMPERATURE: 97.4 F | DIASTOLIC BLOOD PRESSURE: 88 MMHG | WEIGHT: 315 LBS | SYSTOLIC BLOOD PRESSURE: 138 MMHG | BODY MASS INDEX: 44.86 KG/M2 | HEART RATE: 100 BPM

## 2024-04-10 DIAGNOSIS — F41.0 PANIC ATTACK: ICD-10-CM

## 2024-04-10 DIAGNOSIS — I10 HYPERTENSION, UNSPECIFIED TYPE: ICD-10-CM

## 2024-04-10 DIAGNOSIS — E29.1 HYPOGONADISM IN MALE: Primary | ICD-10-CM

## 2024-04-10 DIAGNOSIS — R07.89 CHEST PRESSURE: ICD-10-CM

## 2024-04-10 PROCEDURE — 99215 OFFICE O/P EST HI 40 MIN: CPT

## 2024-04-10 PROCEDURE — 3075F SYST BP GE 130 - 139MM HG: CPT

## 2024-04-10 PROCEDURE — 3079F DIAST BP 80-89 MM HG: CPT

## 2024-04-10 RX ORDER — DEXAMETHASONE 1 MG
1 TABLET ORAL ONCE
Qty: 1 TABLET | Refills: 0 | Status: SHIPPED | OUTPATIENT
Start: 2024-04-10 | End: 2024-04-10

## 2024-04-10 RX ORDER — LISINOPRIL 10 MG/1
10 TABLET ORAL DAILY
Qty: 90 TABLET | Refills: 1 | Status: SHIPPED | OUTPATIENT
Start: 2024-04-10

## 2024-04-10 RX ORDER — ANASTROZOLE 1 MG/1
1 TABLET ORAL DAILY
COMMUNITY

## 2024-04-10 RX ORDER — LISINOPRIL AND HYDROCHLOROTHIAZIDE 20; 12.5 MG/1; MG/1
2 TABLET ORAL DAILY
Qty: 180 TABLET | Refills: 1 | Status: CANCELLED | OUTPATIENT
Start: 2024-04-10 | End: 2025-04-10

## 2024-04-10 RX ORDER — CLONIDINE HYDROCHLORIDE 0.1 MG/1
0.1 TABLET ORAL EVERY 12 HOURS PRN
Qty: 60 TABLET | Refills: 3 | Status: SHIPPED | OUTPATIENT
Start: 2024-04-10 | End: 2025-04-10

## 2024-04-10 RX ORDER — PROPRANOLOL HYDROCHLORIDE 20 MG/1
20 TABLET ORAL 3 TIMES DAILY
Qty: 270 TABLET | Refills: 1 | Status: CANCELLED | OUTPATIENT
Start: 2024-04-10

## 2024-04-10 RX ORDER — LORAZEPAM 0.5 MG/1
0.5 TABLET ORAL EVERY 12 HOURS PRN
Qty: 15 TABLET | Refills: 1 | Status: SHIPPED | OUTPATIENT
Start: 2024-04-10 | End: 2025-04-10

## 2024-04-10 NOTE — PROGRESS NOTES
Anjel Evans (:  2000) is a 23 y.o. male,Established patient, here for evaluation of the following chief complaint(s):  Depression and Hypertension         ASSESSMENT/PLAN:  1. Hypogonadism in male  -     Dexamethasone; Future  -     Cortisol Total; Future  2. Hypertension, unspecified type  -     Dexamethasone; Future  -     Cortisol Total; Future  -     Exercise stress test; Future  -     cloNIDine (CATAPRES) 0.1 MG tablet; Take 1 tablet by mouth every 12 hours as needed for High Blood Pressure (for blood pressure greater than 140/90), Disp-60 tablet, R-3Normal  -     lisinopril (PRINIVIL;ZESTRIL) 10 MG tablet; Take 1 tablet by mouth daily, Disp-90 tablet, R-1Normal  3. Chest pressure  -     Exercise stress test; Future  4. Panic attack  -     LORazepam (ATIVAN) 0.5 MG tablet; Take 1 tablet by mouth every 12 hours as needed for Anxiety (Acute panic attack). Max Daily Amount: 1 mg, Disp-15 tablet, R-1Normal    Previously ordered stress and dex suppression test.  Highly encouraged to do so.   At this time we will start him back on Lisinopril, 10mg, and give him Clonidine to take prn.  Also giving prn Ativan and advised to use only with onset of panic attacks.  He is agreeable and understands this is not to be used on a regular basis.   Alteration to POC pending lab review and stress test   Encouraged healthy diet, low in carbs, fats, and exercise 3-4 days weekly.   Continue close follow up with [facility] infertility clinic/ urology     Return in about 3 months (around 7/10/2024) for HTN and lab review .         Subjective   SUBJECTIVE/OBJECTIVE:  Patient presents today after ER admission for chest heaviness and presumptive panic attack.  Patient has been on Arimidex prescribed by urology for low testosterone levels at [facility] for infertility treatments.  He was advised to stop all of his BP medication prior to starting.  His blood pressure had remained stable without any concerns for the last several months

## 2024-04-11 ENCOUNTER — HOSPITAL ENCOUNTER (OUTPATIENT)
Age: 24
Setting detail: SPECIMEN
Discharge: HOME | End: 2024-04-11

## 2024-04-11 DIAGNOSIS — E29.1 HYPOGONADISM IN MALE: ICD-10-CM

## 2024-04-11 DIAGNOSIS — I10 HYPERTENSION, UNSPECIFIED TYPE: ICD-10-CM

## 2024-04-11 LAB
CORTIS SERPL-MCNC: 0.4 UG/DL (ref 2.5–19.5)
CORTISOL COLLECTION INFO: ABNORMAL

## 2024-04-15 ASSESSMENT — ENCOUNTER SYMPTOMS: CHEST TIGHTNESS: 1

## 2024-04-17 LAB — DEXAMETHASONE: 495.5 NG/DL

## 2024-04-18 ENCOUNTER — TELEPHONE (OUTPATIENT)
Age: 24
End: 2024-04-18

## 2024-04-18 NOTE — TELEPHONE ENCOUNTER
Pre call complete.  Location and time confirmed.  Nothing to eat or drink after midnight.  No caffeine.  Verbal understanding per patient.

## 2024-04-19 ENCOUNTER — HOSPITAL ENCOUNTER (OUTPATIENT)
Age: 24
Discharge: HOME | End: 2024-04-19
Payer: COMMERCIAL

## 2024-04-19 DIAGNOSIS — I10 HYPERTENSION, UNSPECIFIED TYPE: ICD-10-CM

## 2024-04-19 DIAGNOSIS — R07.89 CHEST PRESSURE: ICD-10-CM

## 2024-04-19 LAB
STRESS BASELINE DIAS BP: 70 MMHG
STRESS BASELINE HR: 85 BPM
STRESS BASELINE SYS BP: 121 MMHG
STRESS ESTIMATED WORKLOAD: 8.9 METS
STRESS EXERCISE DUR MIN: 6 MIN
STRESS EXERCISE DUR SEC: 39 SEC
STRESS PEAK DIAS BP: 66 MMHG
STRESS PEAK SYS BP: 205 MMHG
STRESS PERCENT HR ACHIEVED: 96 %
STRESS POST PEAK HR: 187 BPM
STRESS RATE PRESSURE PRODUCT: NORMAL BPM*MMHG
STRESS TARGET HR: 195 BPM

## 2024-04-19 PROCEDURE — 93018 CV STRESS TEST I&R ONLY: CPT | Performed by: INTERNAL MEDICINE

## 2024-04-19 PROCEDURE — 93017 CV STRESS TEST TRACING ONLY: CPT

## 2024-04-19 PROCEDURE — 93016 CV STRESS TEST SUPVJ ONLY: CPT | Performed by: INTERNAL MEDICINE

## 2024-04-19 RX ORDER — SODIUM CHLORIDE 9 MG/ML
500 INJECTION, SOLUTION INTRAVENOUS CONTINUOUS PRN
Status: DISCONTINUED | OUTPATIENT
Start: 2024-04-19 | End: 2024-04-19

## 2024-04-19 RX ORDER — SODIUM CHLORIDE 0.9 % (FLUSH) 0.9 %
5-40 SYRINGE (ML) INJECTION PRN
Status: DISCONTINUED | OUTPATIENT
Start: 2024-04-19 | End: 2024-04-19

## 2024-04-19 RX ORDER — METOPROLOL TARTRATE 1 MG/ML
5 INJECTION, SOLUTION INTRAVENOUS EVERY 5 MIN PRN
Status: DISCONTINUED | OUTPATIENT
Start: 2024-04-19 | End: 2024-04-19

## 2024-04-19 RX ORDER — NITROGLYCERIN 0.4 MG/1
0.4 TABLET SUBLINGUAL EVERY 5 MIN PRN
Status: DISCONTINUED | OUTPATIENT
Start: 2024-04-19 | End: 2024-04-19

## 2024-04-19 RX ORDER — ATROPINE SULFATE 0.1 MG/ML
0.5 INJECTION INTRAVENOUS EVERY 5 MIN PRN
Status: DISCONTINUED | OUTPATIENT
Start: 2024-04-19 | End: 2024-04-19

## 2024-04-19 RX ORDER — ALBUTEROL SULFATE 90 UG/1
2 AEROSOL, METERED RESPIRATORY (INHALATION) PRN
Status: DISCONTINUED | OUTPATIENT
Start: 2024-04-19 | End: 2024-04-19

## 2024-07-22 ENCOUNTER — TELEMEDICINE (OUTPATIENT)
Dept: FAMILY MEDICINE CLINIC | Age: 24
End: 2024-07-22
Payer: COMMERCIAL

## 2024-07-22 DIAGNOSIS — I10 HYPERTENSION, UNSPECIFIED TYPE: ICD-10-CM

## 2024-07-22 DIAGNOSIS — R53.83 FATIGUE, UNSPECIFIED TYPE: Primary | ICD-10-CM

## 2024-07-22 DIAGNOSIS — R51.9 CHRONIC DAILY HEADACHE: ICD-10-CM

## 2024-07-22 DIAGNOSIS — F41.9 ANXIETY: ICD-10-CM

## 2024-07-22 DIAGNOSIS — F41.0 PANIC ATTACK: ICD-10-CM

## 2024-07-22 PROCEDURE — 99214 OFFICE O/P EST MOD 30 MIN: CPT

## 2024-07-22 RX ORDER — LISINOPRIL 10 MG/1
10 TABLET ORAL DAILY
Qty: 90 TABLET | Refills: 1 | Status: SHIPPED | OUTPATIENT
Start: 2024-07-22

## 2024-07-22 RX ORDER — HYDROXYZINE HYDROCHLORIDE 25 MG/1
25 TABLET, FILM COATED ORAL EVERY 8 HOURS PRN
Qty: 90 TABLET | Refills: 1 | Status: SHIPPED | OUTPATIENT
Start: 2024-07-22 | End: 2025-07-22

## 2024-07-22 NOTE — PROGRESS NOTES
Anjel Evans, was evaluated through a synchronous (real-time) audio-video encounter. The patient (or guardian if applicable) is aware that this is a billable service, which includes applicable co-pays. This Virtual Visit was conducted with patient's (and/or legal guardian's) consent. Patient identification was verified, and a caregiver was present when appropriate. Home: [address] [place] [id number]  The patient was located at Facility [facility] [address] [place] [id number]  Provider was located at Home (Appt Dept State): [place]  Confirm you are appropriately licensed, registered, or certified to deliver care in the state where the patient is located as indicated above. If you are not or unsure, please re-schedule the visit: Yes, I confirm.     Anjel Evans (:  2000) is a Established patient, presenting virtually for evaluation of the following:    Assessment & Plan   Below is the assessment and plan developed based on review of pertinent history, physical exam, labs, studies, and medications.  1. Fatigue, unspecified type  -     Sleep Study with PAP Titration; Future  2. Chronic daily headache  -     Sleep Study with PAP Titration; Future  3. Hypertension, unspecified type  -     lisinopril (PRINIVIL;ZESTRIL) 10 MG tablet; Take 1 tablet by mouth daily, Disp-90 tablet, R-1Normal  4. Panic attack  -     hydrOXYzine HCl (ATARAX) 25 MG tablet; Take 1 tablet by mouth every 8 hours as needed for Anxiety, Disp-90 tablet, R-1Normal  5. Anxiety  -     hydrOXYzine HCl (ATARAX) 25 MG tablet; Take 1 tablet by mouth every 8 hours as needed for Anxiety, Disp-90 tablet, R-1Normal    Patient to continue medications as prescribed   Advised 1/2 of his Ativan prn for lower dose. Uses very sparingly   Needs to complete inpatient sleep study with pap titration.  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   Follow up once sleep

## 2024-09-04 ENCOUNTER — HOSPITAL ENCOUNTER (OUTPATIENT)
Dept: SLEEP CENTER | Age: 24
Discharge: HOME | End: 2024-09-06
Payer: COMMERCIAL

## 2024-09-04 VITALS — HEIGHT: 73 IN | WEIGHT: 315 LBS | BODY MASS INDEX: 41.75 KG/M2

## 2024-09-04 DIAGNOSIS — R53.83 FATIGUE, UNSPECIFIED TYPE: ICD-10-CM

## 2024-09-04 DIAGNOSIS — R51.9 CHRONIC DAILY HEADACHE: ICD-10-CM

## 2024-09-04 PROCEDURE — 95811 POLYSOM 6/>YRS CPAP 4/> PARM: CPT

## 2024-09-10 LAB — STATUS: NORMAL

## 2024-09-20 DIAGNOSIS — F41.9 ANXIETY: ICD-10-CM

## 2024-09-20 DIAGNOSIS — F41.0 PANIC ATTACK: ICD-10-CM

## 2024-09-22 RX ORDER — HYDROXYZINE HYDROCHLORIDE 25 MG/1
25 TABLET, FILM COATED ORAL EVERY 8 HOURS PRN
Qty: 90 TABLET | Refills: 0 | Status: SHIPPED | OUTPATIENT
Start: 2024-09-22 | End: 2025-09-22

## 2024-10-02 DIAGNOSIS — G47.33 OSA (OBSTRUCTIVE SLEEP APNEA): Primary | ICD-10-CM

## 2024-10-08 ENCOUNTER — PATIENT MESSAGE (OUTPATIENT)
Dept: FAMILY MEDICINE CLINIC | Age: 24
End: 2024-10-08

## 2024-10-08 DIAGNOSIS — G47.33 OBSTRUCTIVE SLEEP APNEA SYNDROME: Primary | ICD-10-CM

## 2024-10-19 DIAGNOSIS — F41.0 PANIC ATTACK: ICD-10-CM

## 2024-10-19 DIAGNOSIS — F41.9 ANXIETY: ICD-10-CM

## 2024-10-21 NOTE — TELEPHONE ENCOUNTER
LOV 7/22/24  LRF 9/22/24  RTO     Health Maintenance   Topic Date Due    A1C test (Diabetic or Prediabetic)  07/24/2024    Flu vaccine (1) 08/01/2024    COVID-19 Vaccine (1 - 2023-24 season) Never done    DTaP/Tdap/Td vaccine (7 - Td or Tdap) 04/10/2025 (Originally 10/20/2020)    Depression Monitoring  04/09/2025    Hepatitis A vaccine  Completed    Hepatitis B vaccine  Completed    Hib vaccine  Completed    HPV vaccine  Completed    Polio vaccine  Completed    Meningococcal (ACWY) vaccine  Completed    Pneumococcal 0-64 years Vaccine  Aged Out    Varicella vaccine  Discontinued    Depression Screen  Discontinued    Hepatitis C screen  Discontinued    HIV screen  Discontinued             (applicable per patient's age: Cancer Screenings, Depression Screening, Fall Risk Screening, Immunizations)    Hemoglobin A1C (%)   Date Value   07/24/2023 5.5   06/07/2022 5.8     AST (U/L)   Date Value   06/07/2022 23     ALT (U/L)   Date Value   06/07/2022 32     BUN (mg/dL)   Date Value   04/08/2024 15      (goal A1C is < 7)   (goal LDL is <100) need 30-50% reduction from baseline     BP Readings from Last 3 Encounters:   04/10/24 138/88   04/08/24 (!) 126/96   07/24/23 126/88    (goal /80)      All Future Testing planned in [facility]:  Lab Frequency Next Occurrence       Next Visit Date:  No future appointments.         Patient Active Problem List:     Severe obesity (BMI >= 40)     History of chickenpox     Attention deficit hyperactivity disorder (ADHD), combined type     Elevated blood pressure reading     Hypertension     Anxiety     Depression     Family history of MI (myocardial infarction)     Prediabetes     Chronic post-traumatic stress disorder     Tachycardia     Suicidal ideations     Medication side effect

## 2024-10-22 RX ORDER — HYDROXYZINE HYDROCHLORIDE 25 MG/1
25 TABLET, FILM COATED ORAL EVERY 8 HOURS PRN
Qty: 90 TABLET | Refills: 1 | Status: SHIPPED | OUTPATIENT
Start: 2024-10-22 | End: 2025-10-22

## 2024-11-29 DIAGNOSIS — I10 HYPERTENSION, UNSPECIFIED TYPE: ICD-10-CM

## 2024-11-29 RX ORDER — LISINOPRIL 10 MG/1
10 TABLET ORAL DAILY
Qty: 90 TABLET | Refills: 1 | Status: SHIPPED | OUTPATIENT
Start: 2024-11-29

## 2024-11-29 NOTE — TELEPHONE ENCOUNTER
LOV 7/22/24   RTO ---  LRF 7/22/24          Controlled Substance Monitoring:    Acute and Chronic Pain Monitoring:   RX Monitoring Periodic Controlled Substance Monitoring   7/31/2023  12:02 AM No signs of potential drug abuse or diversion identified.

## 2024-12-29 DIAGNOSIS — F41.0 PANIC ATTACK: ICD-10-CM

## 2024-12-29 DIAGNOSIS — F41.9 ANXIETY: ICD-10-CM

## 2024-12-30 RX ORDER — HYDROXYZINE HYDROCHLORIDE 25 MG/1
25 TABLET, FILM COATED ORAL EVERY 8 HOURS PRN
Qty: 90 TABLET | Refills: 1 | Status: SHIPPED | OUTPATIENT
Start: 2024-12-30 | End: 2025-12-30

## 2024-12-30 NOTE — TELEPHONE ENCOUNTER
LOV 7/22/24   RTO ---  LRF 10/22/24          Controlled Substance Monitoring:    Acute and Chronic Pain Monitoring:   RX Monitoring Periodic Controlled Substance Monitoring   7/31/2023  12:02 AM No signs of potential drug abuse or diversion identified.

## 2025-02-28 DIAGNOSIS — F41.9 ANXIETY: ICD-10-CM

## 2025-02-28 DIAGNOSIS — F41.0 PANIC ATTACK: ICD-10-CM

## 2025-03-02 RX ORDER — HYDROXYZINE HYDROCHLORIDE 25 MG/1
25 TABLET, FILM COATED ORAL EVERY 8 HOURS PRN
Qty: 90 TABLET | Refills: 0 | Status: SHIPPED | OUTPATIENT
Start: 2025-03-02 | End: 2026-03-02

## 2025-03-29 DIAGNOSIS — F41.9 ANXIETY: ICD-10-CM

## 2025-03-29 DIAGNOSIS — F41.0 PANIC ATTACK: ICD-10-CM

## 2025-03-31 DIAGNOSIS — F41.9 ANXIETY: ICD-10-CM

## 2025-03-31 DIAGNOSIS — I10 HYPERTENSION, UNSPECIFIED TYPE: ICD-10-CM

## 2025-03-31 DIAGNOSIS — F41.0 PANIC ATTACK: ICD-10-CM

## 2025-03-31 NOTE — TELEPHONE ENCOUNTER
LOV 7/22/24   RTO ---  LRF 3/2/25          Controlled Substance Monitoring:    Acute and Chronic Pain Monitoring:   RX Monitoring Periodic Controlled Substance Monitoring   7/31/2023  12:02 AM No signs of potential drug abuse or diversion identified.

## 2025-04-01 RX ORDER — HYDROXYZINE HYDROCHLORIDE 25 MG/1
25 TABLET, FILM COATED ORAL EVERY 8 HOURS PRN
Qty: 90 TABLET | Refills: 0 | Status: SHIPPED | OUTPATIENT
Start: 2025-04-01 | End: 2026-04-01

## 2025-04-01 RX ORDER — LISINOPRIL 10 MG/1
10 TABLET ORAL DAILY
Qty: 90 TABLET | Refills: 1 | Status: SHIPPED | OUTPATIENT
Start: 2025-04-01

## 2025-04-01 NOTE — TELEPHONE ENCOUNTER
LOV 7/22/24   RTO Mychart message sent   LRF 3/2/25, 11/29/24          Controlled Substance Monitoring:    Acute and Chronic Pain Monitoring:   RX Monitoring Periodic Controlled Substance Monitoring   7/31/2023  12:02 AM No signs of potential drug abuse or diversion identified.

## 2025-04-26 DIAGNOSIS — F41.0 PANIC ATTACK: ICD-10-CM

## 2025-04-26 DIAGNOSIS — F41.9 ANXIETY: ICD-10-CM

## 2025-04-28 RX ORDER — HYDROXYZINE HYDROCHLORIDE 25 MG/1
25 TABLET, FILM COATED ORAL EVERY 8 HOURS PRN
Qty: 90 TABLET | Refills: 0 | Status: SHIPPED | OUTPATIENT
Start: 2025-04-28 | End: 2026-04-28

## 2025-04-28 NOTE — TELEPHONE ENCOUNTER
LOV 07/22/2024   RTO No future appointments.  LRF 04/01/2025          Controlled Substance Monitoring:    Acute and Chronic Pain Monitoring:   RX Monitoring Periodic Controlled Substance Monitoring   7/31/2023  12:02 AM No signs of potential drug abuse or diversion identified.

## 2025-05-31 DIAGNOSIS — F41.0 PANIC ATTACK: ICD-10-CM

## 2025-05-31 DIAGNOSIS — F41.9 ANXIETY: ICD-10-CM

## 2025-06-02 RX ORDER — HYDROXYZINE HYDROCHLORIDE 25 MG/1
25 TABLET, FILM COATED ORAL EVERY 8 HOURS PRN
Qty: 90 TABLET | Refills: 0 | OUTPATIENT
Start: 2025-06-02 | End: 2026-06-02